# Patient Record
Sex: FEMALE | Race: WHITE | NOT HISPANIC OR LATINO | Employment: FULL TIME | URBAN - METROPOLITAN AREA
[De-identification: names, ages, dates, MRNs, and addresses within clinical notes are randomized per-mention and may not be internally consistent; named-entity substitution may affect disease eponyms.]

---

## 2017-03-09 ENCOUNTER — OFFICE VISIT (OUTPATIENT)
Dept: INTERNAL MEDICINE | Facility: CLINIC | Age: 67
End: 2017-03-09
Payer: COMMERCIAL

## 2017-03-09 ENCOUNTER — LAB VISIT (OUTPATIENT)
Dept: LAB | Facility: HOSPITAL | Age: 67
End: 2017-03-09
Attending: INTERNAL MEDICINE
Payer: COMMERCIAL

## 2017-03-09 VITALS
HEIGHT: 66 IN | SYSTOLIC BLOOD PRESSURE: 110 MMHG | BODY MASS INDEX: 24.59 KG/M2 | DIASTOLIC BLOOD PRESSURE: 76 MMHG | HEART RATE: 52 BPM | WEIGHT: 153 LBS

## 2017-03-09 DIAGNOSIS — R51.9 NONINTRACTABLE EPISODIC HEADACHE, UNSPECIFIED HEADACHE TYPE: ICD-10-CM

## 2017-03-09 DIAGNOSIS — G47.00 INSOMNIA, UNSPECIFIED TYPE: ICD-10-CM

## 2017-03-09 DIAGNOSIS — R25.2 FOOT CRAMPS: ICD-10-CM

## 2017-03-09 DIAGNOSIS — F32.A DEPRESSION, UNSPECIFIED DEPRESSION TYPE: ICD-10-CM

## 2017-03-09 DIAGNOSIS — Z23 NEED FOR TDAP VACCINATION: ICD-10-CM

## 2017-03-09 DIAGNOSIS — Z00.00 ANNUAL PHYSICAL EXAM: ICD-10-CM

## 2017-03-09 DIAGNOSIS — Z00.00 ANNUAL PHYSICAL EXAM: Primary | ICD-10-CM

## 2017-03-09 DIAGNOSIS — E89.0 POSTOPERATIVE HYPOTHYROIDISM: ICD-10-CM

## 2017-03-09 LAB
25(OH)D3+25(OH)D2 SERPL-MCNC: 44 NG/ML
ALBUMIN SERPL BCP-MCNC: 3.7 G/DL
ALP SERPL-CCNC: 67 U/L
ALT SERPL W/O P-5'-P-CCNC: 12 U/L
ANION GAP SERPL CALC-SCNC: 9 MMOL/L
AST SERPL-CCNC: 16 U/L
BILIRUB SERPL-MCNC: 0.3 MG/DL
BUN SERPL-MCNC: 14 MG/DL
CALCIUM SERPL-MCNC: 9.1 MG/DL
CHLORIDE SERPL-SCNC: 103 MMOL/L
CO2 SERPL-SCNC: 28 MMOL/L
CREAT SERPL-MCNC: 0.9 MG/DL
EST. GFR  (AFRICAN AMERICAN): >60 ML/MIN/1.73 M^2
EST. GFR  (NON AFRICAN AMERICAN): >60 ML/MIN/1.73 M^2
GLUCOSE SERPL-MCNC: 86 MG/DL
MAGNESIUM SERPL-MCNC: 1.8 MG/DL
POTASSIUM SERPL-SCNC: 4 MMOL/L
PROT SERPL-MCNC: 6.9 G/DL
SODIUM SERPL-SCNC: 140 MMOL/L
TSH SERPL DL<=0.005 MIU/L-ACNC: 1.45 UIU/ML
VIT B12 SERPL-MCNC: 422 PG/ML

## 2017-03-09 PROCEDURE — 36415 COLL VENOUS BLD VENIPUNCTURE: CPT

## 2017-03-09 PROCEDURE — 82306 VITAMIN D 25 HYDROXY: CPT

## 2017-03-09 PROCEDURE — 99396 PREV VISIT EST AGE 40-64: CPT | Mod: 25,S$GLB,, | Performed by: INTERNAL MEDICINE

## 2017-03-09 PROCEDURE — 90471 IMMUNIZATION ADMIN: CPT | Mod: S$GLB,,, | Performed by: INTERNAL MEDICINE

## 2017-03-09 PROCEDURE — 99999 PR PBB SHADOW E&M-EST. PATIENT-LVL III: CPT | Mod: PBBFAC,,, | Performed by: INTERNAL MEDICINE

## 2017-03-09 PROCEDURE — 80053 COMPREHEN METABOLIC PANEL: CPT

## 2017-03-09 PROCEDURE — 90715 TDAP VACCINE 7 YRS/> IM: CPT | Mod: S$GLB,,, | Performed by: INTERNAL MEDICINE

## 2017-03-09 PROCEDURE — 84443 ASSAY THYROID STIM HORMONE: CPT

## 2017-03-09 PROCEDURE — 82607 VITAMIN B-12: CPT

## 2017-03-09 PROCEDURE — 83735 ASSAY OF MAGNESIUM: CPT

## 2017-03-09 RX ORDER — LEVOTHYROXINE SODIUM 175 UG/1
350 TABLET ORAL DAILY
Qty: 180 TABLET | Refills: 3 | Status: SHIPPED | OUTPATIENT
Start: 2017-03-09 | End: 2018-03-23 | Stop reason: SDUPTHER

## 2017-03-09 RX ORDER — ESCITALOPRAM OXALATE 20 MG/1
20 TABLET ORAL DAILY
COMMUNITY
End: 2017-12-12

## 2017-03-09 RX ORDER — FEXOFENADINE HCL AND PSEUDOEPHEDRINE HCI 60; 120 MG/1; MG/1
1 TABLET, EXTENDED RELEASE ORAL DAILY PRN
COMMUNITY
End: 2021-11-09

## 2017-03-09 RX ORDER — BUPROPION HYDROCHLORIDE 150 MG/1
150 TABLET, EXTENDED RELEASE ORAL 2 TIMES DAILY
Qty: 90 TABLET | Refills: 3 | Status: SHIPPED | OUTPATIENT
Start: 2017-03-09 | End: 2017-12-15 | Stop reason: SDUPTHER

## 2017-03-09 RX ORDER — ESZOPICLONE 1 MG/1
1 TABLET, FILM COATED ORAL NIGHTLY
COMMUNITY
End: 2018-04-06 | Stop reason: DRUGHIGH

## 2017-03-09 RX ORDER — CLOBETASOL PROPIONATE 0.5 MG/G
OINTMENT TOPICAL
Refills: 5 | COMMUNITY
Start: 2017-01-01 | End: 2017-03-09

## 2017-03-09 RX ORDER — BUTALBITAL, ACETAMINOPHEN AND CAFFEINE 50; 325; 40 MG/1; MG/1; MG/1
1 TABLET ORAL EVERY 6 HOURS PRN
Qty: 30 TABLET | Refills: 1 | Status: SHIPPED | OUTPATIENT
Start: 2017-03-09 | End: 2018-04-06 | Stop reason: SDUPTHER

## 2017-03-09 NOTE — MR AVS SNAPSHOT
[facility]  [address]  [place] [address]  Phone: [phone]  Fax: [phone]                  Ana Lilia Glass   3/9/2017 1:00 PM   Office Visit    Description:  Female : 1950   Provider:  Santy Momin MD   Department:  [facility]           Reason for Visit     Annual Exam           Diagnoses this Visit        Comments    Annual physical exam    -  Primary     Foot cramps         Postoperative hypothyroidism         Need for Tdap vaccination         Depression, unspecified depression type         Nonintractable episodic headache, unspecified headache type         Insomnia, unspecified type                To Do List           Goals (5 Years of Data)     None      Follow-Up and Disposition     Return in about 1 year (around 3/9/2018) for EPP annual exam.       These Medications        Disp Refills Start End    buPROPion (WELLBUTRIN SR) 150 MG TBSR 12 hr tablet 90 tablet 3 3/9/2017     Take 1 tablet (150 mg total) by mouth 2 (two) times daily. - Oral    Pharmacy: [facility] - [place], [place] - [address] [facility] #: [phone]       levothyroxine (SYNTHROID, LEVOTHROID) 175 MCG tablet 180 tablet 3 3/9/2017     Take 2 tablets (350 mcg total) by mouth once daily. In the morning on an empty stomach - Oral    Pharmacy: [facility] - [place], [place] - [address]  Ph #: [phone]       butalbital-acetaminophen-caffeine -40 mg (FIORICET, ESGIC) -40 mg per tablet 30 tablet 1 3/9/2017     Take 1 tablet by mouth every 6 (six) hours as needed for Pain. - Oral    Pharmacy: [facility] - [place], [place] - [address] [place] [address] Ph #: [phone]         [facility] On Call     [facility] On Call Nurse Care Line -  Assistance  Registered nurses in the [facility] provide clinical advisement, health education, appointment booking, and other advisory services.  Call for this free service at [phone].             Medications  "          Message regarding Medications     Verify the changes and/or additions to your medication regime listed below are the same as discussed with your clinician today.  If any of these changes or additions are incorrect, please notify your healthcare provider.        STOP taking these medications     clobetasol 0.05% (TEMOVATE) 0.05 % Oint VIRGILIO AA BID           Verify that the below list of medications is an accurate representation of the medications you are currently taking.  If none reported, the list may be blank. If incorrect, please contact your healthcare provider. Carry this list with you in case of emergency.           Current Medications     BIOTIN ORAL Take 1 tablet by mouth once daily.    buPROPion (WELLBUTRIN SR) 150 MG TBSR 12 hr tablet Take 1 tablet (150 mg total) by mouth 2 (two) times daily.    butalbital-acetaminophen-caffeine -40 mg (FIORICET, ESGIC) -40 mg per tablet Take 1 tablet by mouth every 6 (six) hours as needed for Pain.    CALCIUM CARBONATE/VITAMIN D3 (VITAMIN D-3 ORAL) Take 1 capsule by mouth once daily.    escitalopram oxalate (LEXAPRO) 20 MG tablet Take 20 mg by mouth once daily.    estrogens, conjugated, (PREMARIN) 0.45 MG tablet 1 tablet.    eszopiclone (LUNESTA) 1 MG Tab Take 1 mg by mouth nightly.    fexofenadine-pseudoephedrine  mg (ALLEGRA-D)  mg per tablet Take 1 tablet by mouth daily as needed.    fluticasone (FLONASE) 50 mcg/actuation nasal spray 1 spray by Each Nare route once daily.    levothyroxine (SYNTHROID, LEVOTHROID) 175 MCG tablet Take 2 tablets (350 mcg total) by mouth once daily. In the morning on an empty stomach    LINZESS 145 mcg Cap capsule 1 tablet.    dexlansoprazole (DEXILANT) 60 mg capsule Take 1 capsule (60 mg total) by mouth once daily.           Clinical Reference Information           Your Vitals Were     BP Pulse Height Weight BMI    110/76 (BP Location: Right arm, Patient Position: Sitting, BP Method: Manual) 52 5' 6" (1.676 " m) 69.4 kg (153 lb) 24.69 kg/m2      Blood Pressure          Most Recent Value    BP  110/76      Allergies as of 3/9/2017     Bactrim [Sulfamethoxazole-trimethoprim]    Codeine    Sulfa (Sulfonamide Antibiotics)      Immunizations Administered on Date of Encounter - 3/9/2017     Name Date Dose VIS Date Route    TDAP 3/9/2017 0.5 mL 2/24/2015 Intramuscular      Orders Placed During Today's Visit      Normal Orders This Visit    Tdap Vaccine     Future Labs/Procedures Expected by Expires    Comprehensive metabolic panel  3/9/2017 3/9/2018    Magnesium  3/9/2017 (Approximate) 5/8/2018    TSH  3/9/2017 (Approximate) 3/9/2018    Vitamin B12  3/9/2017 5/8/2018    Vitamin D  3/9/2017 (Approximate) 5/8/2018      Language Assistance Services     ATTENTION: Language assistance services are available, free of charge. Please call [phone].      ATENCIÓN: Si habla español, tiene a gonzalez disposición servicios gratuitos de asistencia lingüística. Llame al [phone].     CHÚ Ý: N?u b?n nói Ti?ng Vi?t, có các d?ch v? h? tr? ngôn ng? mi?n phí gilmarh cho b?n. G?i s? [phone].         [facility] complies with applicable Federal civil rights laws and does not discriminate on the basis of race, color, national origin, age, disability, or sex.

## 2017-03-09 NOTE — PROGRESS NOTES
Subjective:       Patient ID: Ana Lilia Glass is a 66 y.o. female.    Chief Complaint: Annual Exam    HPI    First visit with me. Seen by Dr London last year.     Noted mm cramps in feet. Only at night. Sporadic. Only when stretching.     Doing better since starting Lexapro and Lunesta PRN.    Reviewed PMH, PSH, SH, FH, allergies, and medications.     Review of Systems   All other systems reviewed and are negative.      Objective:      Physical Exam   Constitutional: She is oriented to person, place, and time. No distress.    woman whose Body mass index is 24.69 kg/(m^2).    HENT:   Head: Atraumatic.   Right Ear: Tympanic membrane normal.   Left Ear: Tympanic membrane normal.   Mouth/Throat: Oropharynx is clear and moist. No oropharyngeal exudate.   Eyes: Pupils are equal, round, and reactive to light. Right eye exhibits no discharge. Left eye exhibits no discharge.   Neck: Normal range of motion. No thyromegaly present.   Cardiovascular: Normal rate, regular rhythm and normal heart sounds.    Pulses:       Dorsalis pedis pulses are 2+ on the left side.        Posterior tibial pulses are 2+ on the left side.   Pulmonary/Chest: Effort normal and breath sounds normal. No stridor. She has no wheezes. She has no rales.   Abdominal: Soft. She exhibits no distension and no mass. There is no hepatosplenomegaly. There is no tenderness. There is no guarding and negative Lang's sign.   Musculoskeletal: She exhibits no edema or tenderness.   L foot without deformity or tenderness to palpation    Lymphadenopathy:     She has no cervical adenopathy.   Neurological: She is alert and oriented to person, place, and time.   Skin: Skin is warm and dry. No rash noted.   Mild superficial varicose veins in L ankle   Psychiatric: She has a normal mood and affect. Her behavior is normal.   Nursing note and vitals reviewed.      Vitals:    03/09/17 1308   BP: 110/76   BP Location: Right arm   Patient Position: Sitting   BP  "Method: Manual   Pulse: (!) 52   Weight: 69.4 kg (153 lb)   Height: 5' 6" (1.676 m)     Body mass index is 24.69 kg/(m^2).    RESULTS: Reviewed labs from last 12 months    The 10-year ASCVD risk score (Jackson HOWELL Jr, et al., 2013) is: 3.2%    Values used to calculate the score:      Age: 66 years      Sex: Female      Is Non- : No      Diabetic: No      Tobacco smoker: No      Systolic Blood Pressure: 110 mmHg      Is BP treated: No      HDL Cholesterol: 70 mg/dL      Total Cholesterol: 188 mg/dL     Assessment:       1. Annual physical exam    2. Foot cramps    3. Postoperative hypothyroidism    4. Need for Tdap vaccination    5. Depression, unspecified depression type    6. Nonintractable episodic headache, unspecified headache type    7. Insomnia, unspecified type        Plan:   Ana Lilia was seen today for annual exam.    Diagnoses and all orders for this visit:    Annual physical exam:  Age-appropriate health screening reviewed, indicated tests ordered.   -     Comprehensive metabolic panel; Future  -     Magnesium; Future  -     TSH; Future  -     Vitamin D; Future  -     Vitamin B12; Future    Foot cramps:  Likely due to lack of stretching, encouraged gentle exercise.    Postoperative hypothyroidism:  Prior diagnosis, well controlled on current management. No changes at this time, will continue to monitor.   -     levothyroxine (SYNTHROID, LEVOTHROID) 175 MCG tablet; Take 2 tablets (350 mcg total) by mouth once daily. In the morning on an empty stomach    Need for Tdap vaccination  -     Tdap Vaccine    Depression, unspecified depression type:  Prior diagnosis, well controlled on current management. No changes at this time, will continue to monitor. Gets Lexapro from outside Psychiatry.  -     buPROPion (WELLBUTRIN SR) 150 MG TBSR 12 hr tablet; Take 1 tablet (150 mg total) by mouth 2 (two) times daily.    Nonintractable episodic headache, unspecified headache type:  Rare, uses Fioricet " rarely, continue PRN.  -     butalbital-acetaminophen-caffeine -40 mg (FIORICET, ESGIC) -40 mg per tablet; Take 1 tablet by mouth every 6 (six) hours as needed for Pain.    Insomnia, unspecified type:  Seen by outside Psychiatry, well controlled on Lunesta.    Return in about 1 year (around 3/9/2018) for EPP annual exam. Labs today.  Santy Momin MD  Internal Medicine    Portions of this note were completed using Dragon medical dictation software. Please excuse typographical or syntax errors.

## 2017-03-13 ENCOUNTER — PATIENT MESSAGE (OUTPATIENT)
Dept: INTERNAL MEDICINE | Facility: CLINIC | Age: 67
End: 2017-03-13

## 2017-10-10 ENCOUNTER — DOCUMENTATION ONLY (OUTPATIENT)
Dept: INTERNAL MEDICINE | Facility: CLINIC | Age: 67
End: 2017-10-10

## 2017-10-10 NOTE — PROGRESS NOTES
Received outside records from Orthopedics, chart updated as appropriate, results will be scanned into EPIC.    Briefly, seen for trochanteric bursitis, received bursa injection. Given prescription for Vimovo and Physical Therapy, reevaluate in 6 weeks with Orthopedics.    Santy Momin MD  Internal Medicine    Portions of this note were completed using [employer] dictation software. Please excuse typographical or syntax errors.

## 2017-12-12 ENCOUNTER — OFFICE VISIT (OUTPATIENT)
Dept: INTERNAL MEDICINE | Facility: CLINIC | Age: 67
End: 2017-12-12
Payer: COMMERCIAL

## 2017-12-12 ENCOUNTER — TELEPHONE (OUTPATIENT)
Dept: INTERNAL MEDICINE | Facility: CLINIC | Age: 67
End: 2017-12-12

## 2017-12-12 ENCOUNTER — LAB VISIT (OUTPATIENT)
Dept: LAB | Facility: HOSPITAL | Age: 67
End: 2017-12-12
Attending: INTERNAL MEDICINE
Payer: COMMERCIAL

## 2017-12-12 VITALS
BODY MASS INDEX: 25.23 KG/M2 | HEART RATE: 65 BPM | WEIGHT: 157 LBS | SYSTOLIC BLOOD PRESSURE: 120 MMHG | DIASTOLIC BLOOD PRESSURE: 75 MMHG | HEIGHT: 66 IN | OXYGEN SATURATION: 95 %

## 2017-12-12 DIAGNOSIS — R11.0 NAUSEA: ICD-10-CM

## 2017-12-12 DIAGNOSIS — R11.0 NAUSEA: Primary | ICD-10-CM

## 2017-12-12 DIAGNOSIS — E03.9 HYPOTHYROIDISM, UNSPECIFIED TYPE: ICD-10-CM

## 2017-12-12 DIAGNOSIS — R42 DIZZINESS: ICD-10-CM

## 2017-12-12 LAB
ALBUMIN SERPL BCP-MCNC: 3.9 G/DL
ALP SERPL-CCNC: 59 U/L
ALT SERPL W/O P-5'-P-CCNC: 8 U/L
ANION GAP SERPL CALC-SCNC: 8 MMOL/L
AST SERPL-CCNC: 14 U/L
BASOPHILS # BLD AUTO: 0.04 K/UL
BASOPHILS NFR BLD: 0.6 %
BILIRUB SERPL-MCNC: 0.2 MG/DL
BUN SERPL-MCNC: 17 MG/DL
CALCIUM SERPL-MCNC: 9.3 MG/DL
CHLORIDE SERPL-SCNC: 105 MMOL/L
CO2 SERPL-SCNC: 27 MMOL/L
CREAT SERPL-MCNC: 0.8 MG/DL
DIFFERENTIAL METHOD: ABNORMAL
EOSINOPHIL # BLD AUTO: 0.1 K/UL
EOSINOPHIL NFR BLD: 0.8 %
ERYTHROCYTE [DISTWIDTH] IN BLOOD BY AUTOMATED COUNT: 13.7 %
EST. GFR  (AFRICAN AMERICAN): >60 ML/MIN/1.73 M^2
EST. GFR  (NON AFRICAN AMERICAN): >60 ML/MIN/1.73 M^2
GLUCOSE SERPL-MCNC: 92 MG/DL
HCT VFR BLD AUTO: 42 %
HGB BLD-MCNC: 13.8 G/DL
LYMPHOCYTES # BLD AUTO: 2 K/UL
LYMPHOCYTES NFR BLD: 31.5 %
MCH RBC QN AUTO: 32.1 PG
MCHC RBC AUTO-ENTMCNC: 32.9 G/DL
MCV RBC AUTO: 98 FL
MONOCYTES # BLD AUTO: 0.6 K/UL
MONOCYTES NFR BLD: 8.7 %
NEUTROPHILS # BLD AUTO: 3.7 K/UL
NEUTROPHILS NFR BLD: 58.2 %
PLATELET # BLD AUTO: 286 K/UL
PMV BLD AUTO: 10.2 FL
POTASSIUM SERPL-SCNC: 4 MMOL/L
PROT SERPL-MCNC: 6.9 G/DL
RBC # BLD AUTO: 4.3 M/UL
SODIUM SERPL-SCNC: 140 MMOL/L
TSH SERPL DL<=0.005 MIU/L-ACNC: 3.6 UIU/ML
WBC # BLD AUTO: 6.32 K/UL

## 2017-12-12 PROCEDURE — 99999 PR PBB SHADOW E&M-EST. PATIENT-LVL III: CPT | Mod: PBBFAC,,, | Performed by: INTERNAL MEDICINE

## 2017-12-12 PROCEDURE — 99214 OFFICE O/P EST MOD 30 MIN: CPT | Mod: S$GLB,,, | Performed by: INTERNAL MEDICINE

## 2017-12-12 PROCEDURE — 80053 COMPREHEN METABOLIC PANEL: CPT

## 2017-12-12 PROCEDURE — 85025 COMPLETE CBC W/AUTO DIFF WBC: CPT

## 2017-12-12 PROCEDURE — 36415 COLL VENOUS BLD VENIPUNCTURE: CPT

## 2017-12-12 PROCEDURE — 84443 ASSAY THYROID STIM HORMONE: CPT

## 2017-12-12 RX ORDER — ONDANSETRON 4 MG/1
4 TABLET, ORALLY DISINTEGRATING ORAL ONCE
Qty: 30 TABLET | Refills: 0 | Status: SHIPPED | OUTPATIENT
Start: 2017-12-12 | End: 2017-12-12 | Stop reason: SDUPTHER

## 2017-12-12 RX ORDER — ONDANSETRON 4 MG/1
4 TABLET, ORALLY DISINTEGRATING ORAL ONCE
Qty: 1 TABLET | Refills: 0 | Status: SHIPPED | OUTPATIENT
Start: 2017-12-12 | End: 2017-12-12 | Stop reason: SDUPTHER

## 2017-12-12 RX ORDER — ONDANSETRON 4 MG/1
4 TABLET, ORALLY DISINTEGRATING ORAL EVERY 8 HOURS PRN
Qty: 30 TABLET | Refills: 0 | Status: SHIPPED | OUTPATIENT
Start: 2017-12-12 | End: 2018-04-06

## 2017-12-12 NOTE — TELEPHONE ENCOUNTER
Pt was at [facility] but only 1 zofran was sent it, went ahead and sent 30 pills, if not better or worsening in 3 days she will let Dr. Lucero or her PCP know.

## 2017-12-12 NOTE — PROGRESS NOTES
"Subjective:       Patient ID: Ana Lilia Glass is a 67 y.o. female.    Chief Complaint: Nausea and Dizziness    HPI urgent, Dr. Momin    Reports that she woke up yesterday w/ dizziness (not lightheadedness/room spinning/nothing is moving around). Dizziness even when sitting. Irrespective of position. If she lies down to go to sleep, she feels ok.   No vomiting/constipation/diarrhea. Has daily BM w/ Linzess. No abdominal pain. Nauseated. Able to keep down food and no vomiting.   Reports glasses are up to date - has upcoming appt w/ eye doctor in January. No blurry vision  No numbness/tingling/weakness/slurred speech.   No fullness in the ear/ear pain/tinnitus. Feels congested but able to breathe normal. C/o bitter metal taste in the mouth for the last few days. No postnasal drip. Blows her nose in the morning but that's chronic.   No fevers. Chills. No dysuria/urinary frequency.  Stays hydrated.      No chest pain/SOB/DUFFY/palpitations/leg swelling.    Pt reports was weaned off of Lexapro a few weeks ago (went from 10mg to 5mg daily for a few weeks, then every other day and then every 2 days).    Review of Systems  as above in HPI.     Objective:      Physical Exam    /75 (BP Location: Left arm, Patient Position: Lying)   Pulse 65   Ht 5' 6" (1.676 m)   Wt 71.2 kg (157 lb)   SpO2 95%   BMI 25.34 kg/m²   Not orthostatic by vitals or symptoms.     GEN - A+OX4, NAD   HEENT - PERRL, EOMI, OP mildly erythematous. MMM. TM normal.   Neck - No thyromegaly or cervical LAD. No thyroid masses felt.  CV - RRR, no m/r   Chest - CTAB, no wheezing or rhonchi  Abd - S/NT/ND/+BS.   Ext - 2+BDP and radial pulses. No LE edema.   Neuro - 5/5 BUE and BLE strength. PERRL, EOMI, no nystagmus, eyebrow raise, facial sensation, hearing, m of mastication, smile, palatal raise, shoulder shrug, tongue protrusion symmetric and intact. Sensation to light touch intact throughout. 2+ DTRs. No dysdiadokinesia.  Skin - No rash.    Previous " labs reviewed.    Assessment/Plan     Ana Lilia was seen today for nausea and dizziness.    Diagnoses and all orders for this visit:    Nausea  -     Comprehensive metabolic panel; Future  -     CBC auto differential; Future  -     ondansetron (ZOFRAN-ODT) 4 MG TbDL; Take 1 tablet (4 mg total) by mouth once.    Hypothyroidism, unspecified type - cont synthroid.  -     TSH; Future    Dizziness - normal neurological exam. Not orthostatic. Will check labs.       Return if symptoms worsen or fail to improve.      Marianna Lucero MD  Department of Internal Medicine - [facility]  1:30 PM

## 2017-12-13 ENCOUNTER — TELEPHONE (OUTPATIENT)
Dept: INTERNAL MEDICINE | Facility: CLINIC | Age: 67
End: 2017-12-13

## 2017-12-13 NOTE — TELEPHONE ENCOUNTER
----- Message from Harjeet Cole sent at 12/12/2017  2:23 PM CST -----  Contact: Ludwin nolan/santa [phone]  She need to verify the dosage and the quantity of the prescription you just sent.    Thank you

## 2017-12-15 DIAGNOSIS — F32.A DEPRESSION, UNSPECIFIED DEPRESSION TYPE: ICD-10-CM

## 2017-12-18 RX ORDER — BUPROPION HYDROCHLORIDE 150 MG/1
TABLET, EXTENDED RELEASE ORAL
Qty: 180 TABLET | Refills: 3 | Status: SHIPPED | OUTPATIENT
Start: 2017-12-18 | End: 2018-04-06 | Stop reason: SDUPTHER

## 2018-02-22 ENCOUNTER — PATIENT MESSAGE (OUTPATIENT)
Dept: INTERNAL MEDICINE | Facility: CLINIC | Age: 68
End: 2018-02-22

## 2018-03-23 DIAGNOSIS — E89.0 POSTOPERATIVE HYPOTHYROIDISM: ICD-10-CM

## 2018-03-23 RX ORDER — LEVOTHYROXINE SODIUM 175 UG/1
350 TABLET ORAL DAILY
Qty: 180 TABLET | Refills: 3 | Status: SHIPPED | OUTPATIENT
Start: 2018-03-23 | End: 2019-01-02 | Stop reason: SDUPTHER

## 2018-04-06 ENCOUNTER — OFFICE VISIT (OUTPATIENT)
Dept: INTERNAL MEDICINE | Facility: CLINIC | Age: 68
End: 2018-04-06
Payer: COMMERCIAL

## 2018-04-06 VITALS
WEIGHT: 158.75 LBS | HEART RATE: 60 BPM | SYSTOLIC BLOOD PRESSURE: 116 MMHG | HEIGHT: 67 IN | BODY MASS INDEX: 24.92 KG/M2 | DIASTOLIC BLOOD PRESSURE: 68 MMHG

## 2018-04-06 DIAGNOSIS — Z23 NEED FOR VACCINATION WITH 13-POLYVALENT PNEUMOCOCCAL CONJUGATE VACCINE: ICD-10-CM

## 2018-04-06 DIAGNOSIS — F32.A DEPRESSION, UNSPECIFIED DEPRESSION TYPE: ICD-10-CM

## 2018-04-06 DIAGNOSIS — G47.62 NOCTURNAL LEG CRAMPS: ICD-10-CM

## 2018-04-06 DIAGNOSIS — E89.0 POSTOPERATIVE HYPOTHYROIDISM: ICD-10-CM

## 2018-04-06 DIAGNOSIS — R51.9 NONINTRACTABLE EPISODIC HEADACHE, UNSPECIFIED HEADACHE TYPE: ICD-10-CM

## 2018-04-06 DIAGNOSIS — K21.9 GASTROESOPHAGEAL REFLUX DISEASE, ESOPHAGITIS PRESENCE NOT SPECIFIED: ICD-10-CM

## 2018-04-06 DIAGNOSIS — J30.1 SEASONAL ALLERGIC RHINITIS DUE TO POLLEN, UNSPECIFIED CHRONICITY: ICD-10-CM

## 2018-04-06 DIAGNOSIS — Z00.00 ANNUAL PHYSICAL EXAM: Primary | ICD-10-CM

## 2018-04-06 PROCEDURE — 99397 PER PM REEVAL EST PAT 65+ YR: CPT | Mod: S$GLB,,, | Performed by: INTERNAL MEDICINE

## 2018-04-06 PROCEDURE — 99999 PR PBB SHADOW E&M-EST. PATIENT-LVL III: CPT | Mod: PBBFAC,,, | Performed by: INTERNAL MEDICINE

## 2018-04-06 RX ORDER — ESCITALOPRAM OXALATE 10 MG/1
10 TABLET ORAL DAILY
COMMUNITY
Start: 2018-02-07 | End: 2020-06-12

## 2018-04-06 RX ORDER — ESZOPICLONE 2 MG/1
2 TABLET, FILM COATED ORAL NIGHTLY PRN
COMMUNITY
Start: 2018-03-13 | End: 2020-06-12

## 2018-04-06 RX ORDER — FLUTICASONE PROPIONATE 50 MCG
1 SPRAY, SUSPENSION (ML) NASAL DAILY
Qty: 16 G | Refills: 2 | Status: SHIPPED | OUTPATIENT
Start: 2018-04-06 | End: 2022-05-10 | Stop reason: SDUPTHER

## 2018-04-06 RX ORDER — BUPROPION HYDROCHLORIDE 150 MG/1
150 TABLET, EXTENDED RELEASE ORAL 2 TIMES DAILY
Qty: 180 TABLET | Refills: 3 | Status: SHIPPED | OUTPATIENT
Start: 2018-04-06 | End: 2019-05-10 | Stop reason: SDUPTHER

## 2018-04-06 RX ORDER — BUTALBITAL, ACETAMINOPHEN AND CAFFEINE 50; 325; 40 MG/1; MG/1; MG/1
1 TABLET ORAL EVERY 6 HOURS PRN
Qty: 30 TABLET | Refills: 2 | Status: SHIPPED | OUTPATIENT
Start: 2018-04-06 | End: 2019-05-10 | Stop reason: SDUPTHER

## 2018-04-06 NOTE — PROGRESS NOTES
"Subjective:       Patient ID: Ana Lilia Glass is a 67 y.o. female.    Chief Complaint: Annual Exam    HPI    Last visit with me 03/2017. Seen since by Internal Medicine.  Received documentation from outside orthopedics noting that the patient had a trochanteric bursa injection.    Patient is doing well and in usual state of health.  Continues to do walking for exercise, but no other regular exercise.  Occasionally gets charley horses at night.    Reviewed PMH, PSH, SH, FH, allergies, and medications.     Review of Systems   All other systems reviewed and are negative.      Objective:      Physical Exam   Constitutional: She is oriented to person, place, and time. No distress.   HENT:   Head: Atraumatic.   Right Ear: Tympanic membrane normal. No tenderness.   Left Ear: Tympanic membrane normal. No tenderness.   Mouth/Throat: Oropharynx is clear and moist. No oropharyngeal exudate.   Eyes: Pupils are equal, round, and reactive to light. Right eye exhibits no discharge. Left eye exhibits no discharge.   Neck: Normal range of motion. No thyromegaly present.   Cardiovascular: Normal rate, regular rhythm and normal heart sounds.    Pulmonary/Chest: Effort normal and breath sounds normal. No stridor. She has no wheezes. She has no rales.   Abdominal: Soft. She exhibits no distension and no mass. There is no tenderness. There is no guarding.   Musculoskeletal: She exhibits no edema or tenderness.   Lymphadenopathy:     She has no cervical adenopathy.   Neurological: She is alert and oriented to person, place, and time.   Skin: Skin is warm and dry. No rash noted.   Mild varicose veins in bilateral lower extremities    Psychiatric: She has a normal mood and affect. Her behavior is normal.   Nursing note and vitals reviewed.      Vitals:    04/06/18 1601   BP: 116/68   BP Location: Right arm   Patient Position: Sitting   BP Method: Large (Manual)   Pulse: 60   Weight: 72 kg (158 lb 11.7 oz)   Height: 5' 6.5" (1.689 m) "     Body mass index is 25.24 kg/m².    RESULTS: Reviewed labs from last 12 months    The 10-year ASCVD risk score (Jacksongwendolyn HOWELL Jr., et al., 2013) is: 4%    Values used to calculate the score:      Age: 67 years      Sex: Female      Is Non- : No      Diabetic: No      Tobacco smoker: No      Systolic Blood Pressure: 116 mmHg      Is BP treated: No      HDL Cholesterol: 70 mg/dL      Total Cholesterol: 188 mg/dL     Assessment:       1. Annual physical exam    2. Postoperative hypothyroidism    3. Gastroesophageal reflux disease, esophagitis presence not specified    4. Nonintractable episodic headache, unspecified headache type    5. Depression, unspecified depression type    6. Seasonal allergic rhinitis due to pollen, unspecified chronicity    7. Nocturnal leg cramps    8. Need for vaccination with 13-polyvalent pneumococcal conjugate vaccine        Plan:   Ana Lilia was seen today for annual exam.    Diagnoses and all orders for this visit:    Annual physical exam:  Age-appropriate health screening reviewed, indicated tests ordered. Prevnar today.    Postoperative hypothyroidism:  Prior diagnosis, well controlled on current management. No changes at this time, will continue to monitor.   -     TSH; Future    Gastroesophageal reflux disease, esophagitis presence not specified:  Prior diagnosis, well controlled on current management. No changes at this time, will continue to monitor.   -     Comprehensive metabolic panel; Future  -     CBC Without Differential; Future    Nonintractable episodic headache, unspecified headache type:  Prior diagnosis, well controlled on current management. No changes at this time, will continue to monitor.   -     butalbital-acetaminophen-caffeine -40 mg (FIORICET, ESGIC) -40 mg per tablet; Take 1 tablet by mouth every 6 (six) hours as needed for Pain.    Depression, unspecified depression type:  Prior diagnosis, well controlled on current management.  No changes at this time, will continue to monitor.   -     buPROPion (WELLBUTRIN SR) 150 MG TBSR 12 hr tablet; Take 1 tablet (150 mg total) by mouth 2 (two) times daily.    Seasonal allergic rhinitis due to pollen, unspecified chronicity:  Prior diagnosis, well controlled on current management. No changes at this time, will continue to monitor.   -     fluticasone (FLONASE) 50 mcg/actuation nasal spray; 1 spray (50 mcg total) by Each Nare route once daily.    Nocturnal leg cramps:  Chronic problem, not overly problematic. Encourage gentle stretching daily.    Follow-up in about 1 year (around 4/6/2019) for EPP annual exam, fasting labs 1 week prior.  Santy Momin MD  Internal Medicine    Portions of this note were completed using [employer] dictation software. Please excuse typographical or syntax errors.

## 2018-04-16 ENCOUNTER — DOCUMENTATION ONLY (OUTPATIENT)
Dept: INTERNAL MEDICINE | Facility: CLINIC | Age: 68
End: 2018-04-16

## 2018-04-16 DIAGNOSIS — Z87.39 HISTORY OF OSTEOPOROSIS: ICD-10-CM

## 2018-04-16 NOTE — PROGRESS NOTES
Received outside records from [facility], chart updated as appropriate, results will be scanned into EPIC.    Briefly, DEXA scan performed March 5, 2016 demonstrated normal bone density with no osteopenia and no increased fracture risk.  Mammogram performed March 17, 2018 demonstrated no malignancy, recommended 1 year screening mammogram.    Santy Momin MD  Internal Medicine    Portions of this note were completed using [employer] dictation software. Please excuse typographical or syntax errors.

## 2019-01-02 DIAGNOSIS — E89.0 POSTOPERATIVE HYPOTHYROIDISM: ICD-10-CM

## 2019-01-02 RX ORDER — LEVOTHYROXINE SODIUM 175 UG/1
TABLET ORAL
Qty: 180 TABLET | Refills: 2 | Status: SHIPPED | OUTPATIENT
Start: 2019-01-02 | End: 2019-02-12 | Stop reason: SDUPTHER

## 2019-02-11 ENCOUNTER — PATIENT MESSAGE (OUTPATIENT)
Dept: INTERNAL MEDICINE | Facility: CLINIC | Age: 69
End: 2019-02-11

## 2019-02-11 DIAGNOSIS — E89.0 POSTOPERATIVE HYPOTHYROIDISM: ICD-10-CM

## 2019-02-12 RX ORDER — LEVOTHYROXINE SODIUM 175 UG/1
TABLET ORAL
Qty: 180 TABLET | Refills: 3 | Status: SHIPPED | OUTPATIENT
Start: 2019-02-12 | End: 2020-06-12 | Stop reason: DRUGHIGH

## 2019-02-12 NOTE — TELEPHONE ENCOUNTER
Med refilled. Please schedule the patient for fasting labs 1 week prior to her next visit with me (labs ordered 04/2018).

## 2019-02-21 ENCOUNTER — IMMUNIZATION (OUTPATIENT)
Dept: PHARMACY | Facility: CLINIC | Age: 69
End: 2019-02-21
Payer: MEDICARE

## 2019-04-02 ENCOUNTER — LAB VISIT (OUTPATIENT)
Dept: LAB | Facility: HOSPITAL | Age: 69
End: 2019-04-02
Attending: INTERNAL MEDICINE
Payer: MEDICARE

## 2019-04-02 DIAGNOSIS — E89.0 POSTOPERATIVE HYPOTHYROIDISM: ICD-10-CM

## 2019-04-02 DIAGNOSIS — K21.9 GASTROESOPHAGEAL REFLUX DISEASE, ESOPHAGITIS PRESENCE NOT SPECIFIED: ICD-10-CM

## 2019-04-02 LAB
ALBUMIN SERPL BCP-MCNC: 4.3 G/DL (ref 3.5–5.2)
ALP SERPL-CCNC: 79 U/L (ref 55–135)
ALT SERPL W/O P-5'-P-CCNC: 15 U/L (ref 10–44)
ANION GAP SERPL CALC-SCNC: 6 MMOL/L (ref 8–16)
AST SERPL-CCNC: 16 U/L (ref 10–40)
BILIRUB SERPL-MCNC: 0.4 MG/DL (ref 0.1–1)
BUN SERPL-MCNC: 14 MG/DL (ref 8–23)
CALCIUM SERPL-MCNC: 9.4 MG/DL (ref 8.7–10.5)
CHLORIDE SERPL-SCNC: 109 MMOL/L (ref 95–110)
CO2 SERPL-SCNC: 26 MMOL/L (ref 23–29)
CREAT SERPL-MCNC: 0.9 MG/DL (ref 0.5–1.4)
ERYTHROCYTE [DISTWIDTH] IN BLOOD BY AUTOMATED COUNT: 13.5 % (ref 11.5–14.5)
EST. GFR  (AFRICAN AMERICAN): >60 ML/MIN/1.73 M^2
EST. GFR  (NON AFRICAN AMERICAN): >60 ML/MIN/1.73 M^2
GLUCOSE SERPL-MCNC: 104 MG/DL (ref 70–110)
HCT VFR BLD AUTO: 46.9 % (ref 37–48.5)
HGB BLD-MCNC: 15.1 G/DL (ref 12–16)
MCH RBC QN AUTO: 31.5 PG (ref 27–31)
MCHC RBC AUTO-ENTMCNC: 32.2 G/DL (ref 32–36)
MCV RBC AUTO: 98 FL (ref 82–98)
PLATELET # BLD AUTO: 293 K/UL (ref 150–350)
PMV BLD AUTO: 10.1 FL (ref 9.2–12.9)
POTASSIUM SERPL-SCNC: 4.5 MMOL/L (ref 3.5–5.1)
PROT SERPL-MCNC: 7.3 G/DL (ref 6–8.4)
RBC # BLD AUTO: 4.8 M/UL (ref 4–5.4)
SODIUM SERPL-SCNC: 141 MMOL/L (ref 136–145)
TSH SERPL DL<=0.005 MIU/L-ACNC: 1.56 UIU/ML (ref 0.4–4)
WBC # BLD AUTO: 5.91 K/UL (ref 3.9–12.7)

## 2019-04-02 PROCEDURE — 84443 ASSAY THYROID STIM HORMONE: CPT | Mod: HCNC

## 2019-04-02 PROCEDURE — 80053 COMPREHEN METABOLIC PANEL: CPT | Mod: HCNC

## 2019-04-02 PROCEDURE — 36415 COLL VENOUS BLD VENIPUNCTURE: CPT | Mod: HCNC

## 2019-04-02 PROCEDURE — 85027 COMPLETE CBC AUTOMATED: CPT | Mod: HCNC

## 2019-04-09 ENCOUNTER — OFFICE VISIT (OUTPATIENT)
Dept: INTERNAL MEDICINE | Facility: CLINIC | Age: 69
End: 2019-04-09
Payer: MEDICARE

## 2019-04-09 ENCOUNTER — IMMUNIZATION (OUTPATIENT)
Dept: PHARMACY | Facility: CLINIC | Age: 69
End: 2019-04-09
Payer: MEDICARE

## 2019-04-09 VITALS
WEIGHT: 158 LBS | SYSTOLIC BLOOD PRESSURE: 120 MMHG | HEART RATE: 61 BPM | DIASTOLIC BLOOD PRESSURE: 76 MMHG | BODY MASS INDEX: 25.39 KG/M2 | HEIGHT: 66 IN

## 2019-04-09 DIAGNOSIS — Z13.6 SCREENING FOR CARDIOVASCULAR CONDITION: ICD-10-CM

## 2019-04-09 DIAGNOSIS — M54.50 ACUTE RIGHT-SIDED LOW BACK PAIN WITHOUT SCIATICA: ICD-10-CM

## 2019-04-09 DIAGNOSIS — Z00.00 ANNUAL PHYSICAL EXAM: Primary | ICD-10-CM

## 2019-04-09 DIAGNOSIS — Z23 NEED FOR 23-POLYVALENT PNEUMOCOCCAL POLYSACCHARIDE VACCINE: ICD-10-CM

## 2019-04-09 DIAGNOSIS — E89.0 POSTOPERATIVE HYPOTHYROIDISM: ICD-10-CM

## 2019-04-09 DIAGNOSIS — K21.9 GASTROESOPHAGEAL REFLUX DISEASE, ESOPHAGITIS PRESENCE NOT SPECIFIED: ICD-10-CM

## 2019-04-09 DIAGNOSIS — M25.552 LEFT HIP PAIN: ICD-10-CM

## 2019-04-09 PROCEDURE — 99999 PR PBB SHADOW E&M-EST. PATIENT-LVL III: ICD-10-PCS | Mod: PBBFAC,HCNC,, | Performed by: INTERNAL MEDICINE

## 2019-04-09 PROCEDURE — 99397 PER PM REEVAL EST PAT 65+ YR: CPT | Mod: HCNC,S$GLB,, | Performed by: INTERNAL MEDICINE

## 2019-04-09 PROCEDURE — 99397 PR PREVENTIVE VISIT,EST,65 & OVER: ICD-10-PCS | Mod: HCNC,S$GLB,, | Performed by: INTERNAL MEDICINE

## 2019-04-09 PROCEDURE — 99999 PR PBB SHADOW E&M-EST. PATIENT-LVL III: CPT | Mod: PBBFAC,HCNC,, | Performed by: INTERNAL MEDICINE

## 2019-04-09 RX ORDER — PANTOPRAZOLE SODIUM 40 MG/1
40 TABLET, DELAYED RELEASE ORAL
Qty: 90 TABLET | Refills: 3 | Status: SHIPPED | OUTPATIENT
Start: 2019-04-09 | End: 2020-06-12

## 2019-04-09 NOTE — PROGRESS NOTES
Subjective:       Patient ID: Ana Lilia Glass is a 68 y.o. female.    Chief Complaint: Annual Exam    HPI    Last visit with me 1 year ago. Since then had outside imaging. Briefly, DEXA scan performed March 5, 2016 demonstrated normal bone density with no osteopenia and no increased fracture risk.  Mammogram performed March 17, 2018 demonstrated no malignancy, recommended 1 year screening mammogram.    Pt reports pain in right lower back and left hip, mostly in the morning. Not severe, improves as day goes on. Has history of trochanteric bursitis of left hip.    Has cough, reports GERD symptoms. Had been drinking more wine, but stopped and cough has persisted. At night the symptoms are problematic. No heartburn. Taking omeprazole, doesn't work, doing for 3 weeks in AM. Had EGD in past, noted some mild hiatal hernia. No dyspnea on exertion. Sleeping okay. Has had bad allergies last few wks. More headache in AM, resolved with Fioricet. Cough preceded this.    Reviewed PMH, PSH, SH, FH, allergies, and medications.     Review of Systems   All other systems reviewed and are negative.      Objective:      Physical Exam   Constitutional: She is oriented to person, place, and time. No distress.   HENT:   Head: Atraumatic.   Right Ear: Tympanic membrane normal. No tenderness.   Left Ear: Tympanic membrane normal. No tenderness.   Mouth/Throat: Oropharynx is clear and moist. No oropharyngeal exudate.   Eyes: Pupils are equal, round, and reactive to light. Right eye exhibits no discharge. Left eye exhibits no discharge.   Neck: Normal range of motion. No thyromegaly present.   Cardiovascular: Normal rate, regular rhythm and normal heart sounds.   Pulmonary/Chest: Effort normal and breath sounds normal. No stridor. She has no wheezes. She has no rales.   Abdominal: Soft. Bowel sounds are normal. She exhibits no distension and no mass. There is no tenderness. There is no guarding.   Musculoskeletal: She exhibits no edema or  "tenderness.   Lymphadenopathy:     She has no cervical adenopathy.   Neurological: She is alert and oriented to person, place, and time.   Skin: Skin is warm and dry. Rash (mild faintly erythematous papular rash in anterior shins) noted.   Psychiatric: She has a normal mood and affect. Her behavior is normal.   Nursing note and vitals reviewed.      Vitals:    04/09/19 1328   BP: 120/76   BP Location: Right arm   Patient Position: Sitting   BP Method: Small (Manual)   Pulse: 61   Weight: 71.7 kg (158 lb)   Height: 5' 6" (1.676 m)     Body mass index is 25.5 kg/m².    RESULTS: Reviewed labs from last 12 months    Assessment:       1. Annual physical exam    2. Postoperative hypothyroidism    3. Screening for cardiovascular condition    4. Acute right-sided low back pain without sciatica    5. Left hip pain    6. Gastroesophageal reflux disease, esophagitis presence not specified    7. Need for 23-polyvalent pneumococcal polysaccharide vaccine        Plan:   Ana Lilia was seen today for annual exam.    Diagnoses and all orders for this visit:    Annual physical exam:  Age-appropriate health screening reviewed, indicated tests ordered.     Postoperative hypothyroidism:  Prior diagnosis, stable, well controlled on current management. No changes at this time, will continue to monitor.   -     TSH; Future    Screening for cardiovascular condition  -     CBC Without Differential; Future  -     Comprehensive metabolic panel; Future  -     Lipid panel; Future    Acute right-sided low back pain without sciatica:  New problem, not severe, possibly lumbar strain and/or piriformis syndrome. Refer to Physical Therapy for evaluation and treatment.  -     Ambulatory Referral to Physical/Occupational Therapy    Left hip pain:  New episode, likely trochanteric bursitis, see Physical Therapy.  -     Ambulatory Referral to Physical/Occupational Therapy    Gastroesophageal reflux disease, esophagitis presence not specified:  Prior " "diagnosis, recently more active.  Has been taking over-the-counter omeprazole in the morning for 3 weeks without resolution.  No heartburn but does have dry cough.  Symptoms are worse at night after dinner.  Start Protonix, instructions provided:  "Try starting the omeprazole 20-30 min before dinner until pantoprazole arrives. Use pantoprazole for at least 2 weeks. After 2 weeks you can see about using just as needed. If symptoms persist or worsen despite the pantoprazole, notify the office."  -     pantoprazole (PROTONIX) 40 MG tablet; Take 1 tablet (40 mg total) by mouth before dinner.    Need for 23-polyvalent pneumococcal polysaccharide vaccine    Follow up in about 1 year (around 4/9/2020) for EPP annual exam, fasting labs 1 week prior.  Santy Momin MD  Internal Medicine    Portions of this note were completed using medical dictation software. Please excuse typographical or syntax errors that were missed on review.    "

## 2019-04-09 NOTE — PATIENT INSTRUCTIONS
Try starting the omeprazole 20-30 min before dinner until pantoprazole arrives. Use pantoprazole for at least 2 weeks. After 2 weeks you can see about using just as needed. If symptoms persist or worsen despite the pantoprazole, notify the office.

## 2019-04-24 ENCOUNTER — CLINICAL SUPPORT (OUTPATIENT)
Dept: REHABILITATION | Facility: HOSPITAL | Age: 69
End: 2019-04-24
Attending: INTERNAL MEDICINE
Payer: MEDICARE

## 2019-04-24 DIAGNOSIS — G89.29 CHRONIC RIGHT-SIDED LOW BACK PAIN WITHOUT SCIATICA: ICD-10-CM

## 2019-04-24 DIAGNOSIS — M54.50 CHRONIC RIGHT-SIDED LOW BACK PAIN WITHOUT SCIATICA: ICD-10-CM

## 2019-04-24 DIAGNOSIS — Z74.09 IMPAIRED MOBILITY: ICD-10-CM

## 2019-04-24 DIAGNOSIS — M53.3 SACRAL PAIN: Primary | ICD-10-CM

## 2019-04-24 PROCEDURE — 97110 THERAPEUTIC EXERCISES: CPT | Mod: HCNC,PN

## 2019-04-24 PROCEDURE — G8978 MOBILITY CURRENT STATUS: HCPCS | Mod: CJ,HCNC,PN

## 2019-04-24 PROCEDURE — 97161 PT EVAL LOW COMPLEX 20 MIN: CPT | Mod: HCNC,PN

## 2019-04-24 PROCEDURE — G8979 MOBILITY GOAL STATUS: HCPCS | Mod: CJ,HCNC,PN

## 2019-04-24 NOTE — PATIENT INSTRUCTIONS
PUSH/PULL Pelvic Correction      Wrap R hand around R knee and place L hand on top of L knee while lying on back. Pull R knee down towards mat, R hand applying counter force to prevent movement. Push L knee towards you into L hand, also applying counter force to prevent movement. Do not perform with knees together.   Hold 8-10 seconds. Repeat 3 times. Perform 3-4 times a day or when familiar pain occurs.

## 2019-04-24 NOTE — PLAN OF CARE
[facility]  Physical Therapy Initial Evaluation    Name: Ana Lilia Glass  Clinic Number: [id number]    Therapy Diagnosis:   Encounter Diagnoses   Name Primary?    Sacral pain Yes    Chronic right-sided low back pain without sciatica     Impaired mobility      Physician: Santy Momin MD    Physician Orders: PT Eval and Treat   Medical Diagnosis:   M54.5 (ICD-10-CM) - Acute right-sided low back pain without sciatica   M25.552 (ICD-10-CM) - Left hip pain     Evaluation Date: 4/24/2019  Authorization Period Expiration: 12/31/2019  Plan of Care Certification Period: 4/24/2019 to 6/7/2019  Visit # / Visits authorized: 1/50  FOTO: 1/10    Time In: 1113  Time Out: 1200  Total Billable Time: 47 minutes    Precautions: Standard    Subjective     Date of onset: 6 months    History of current condition - Ana Lilia reports: insidious R sided low back pain that has remained constant since onset. Movement relives it. Increases with rolling to alternate sidelying, getting out of bed in the morning, and sit>stands greatest after prolonged sitting durations. Patient with chronic history of L hip bursitis however denies relation to current pain. History of steroid injections to L hip, last one a year ago which has relieved pain. Patient think this pain may come back soon however.     Past Medical History:   Diagnosis Date    Colon polyps     Hiatal hernia     Osteoporosis      Ana Lilia Glass  has a past surgical history that includes Total thyroidectomy (1997); Colonoscopy (2011); Hemorrhoid surgery (12/2016); and Hysterectomy (06/2016).    Ana Lilia has a current medication list which includes the following prescription(s): biotin, bupropion, butalbital-acetaminophen-caffeine -40 mg, calcium carbonate/vitamin d3, escitalopram oxalate, eszopiclone, fexofenadine-pseudoephedrine  mg, fluticasone, levothyroxine, linzess, and pantoprazole.    Review of patient's allergies indicates:    Allergen Reactions    Bactrim [sulfamethoxazole-trimethoprim] Other (See Comments)     Unknown    Codeine Itching and Rash    Sulfa (sulfonamide antibiotics) Other (See Comments)     Pt was 5yrs old      Prior Therapy: for L hip bursitis; not for low back pain  Social History: Patient lives alone in 2 story house  Occupation: Retired  Prior Level of Function: Indedpendent  Current Level of Function: Independent; pain and stiffness upon getting out of bed and with sit<>stands    Pain:  Current 0/10, worst 5/10, best 0/10   Location: R side of low back  Description: Dull, ache  Aggravating Factors: Rolling to alternate sidelying, getting out of bed in the morning, and sit>stands more greatest prolonged sitting durations  Easing Factors: Movement    Pts goals: Get rid of pain    Objective     Posture: R trunk lean; elevated R iliac crest and PSIS  Palpation: Tenderness to B PSIS, R>L; tenderness to R lumbar erectors and over L4-5 spinous processes    Range of Motion/Strength:     Lumbar AROM: Pain/Dysfunction with Movement:   Flexion Normal, no pain   Extension Moderate loss; pain to R side of sacrum, decreased following 10 reps   Right side bending Minimal loss; pain to R side of sacrum, decreased following 10 reps   Left side bending Minimal loss, stretch to R side of low back   Right rotation Normal, no pain   Left rotation Normal, no pain     Hip  Right   Left  Pain/Dysfunction with Movement    AROM PROM MMT AROM PROM MMT    Flexion WNL WNL! 4+/5 WNL WNL! 4+/5 Pain to R side of sacrum   Extension WNL NT 4/5 WNL NT 4-/5! Pain to R side of sacrum   Abduction WNL NT 4/5 WNL NT 4/5    Adduction WNL NT 4+/5 WNL NT 3+/5    Internal rotation WNL WNL 4+/5 WNL WNL! 4+/5 Pain to R side of sacrum   External rotation WNL WNL! 4+/5 WNL WNL 4+/5 Pain to R side of hip      Knee  Right   Left  Pain/Dysfunction with Movement    AROM PROM MMT AROM PROM MMT    Flexion WNL NT 5/5 WNL NT 5/5    Extension WNL NT 5/5 WNL NT 5/5   "    Flexibility: 9090 hamstring length: moderate impairment R, mild impairment L  Gait: no AD  Analysis: WNL  Special tests: Prone knee bend (+) R for R sacral pain, (-) B for flexibility impairment  SI compression: (-) B  SI distraction: (-)  Sacral thrust: (+) for pain  FADIR (+) L for R sacral pain, (-) R  RAFY (+) R for R hip pain, (-) L  Long sitting test: (+) for R anterior rotated innominate    Other: Increased R sacral pain with prone knee flexion, greater on L    CMS Impairment/Limitation/Restriction for FOTO Lumbar Spine Survey    Therapist reviewed FOTO scores for Ana Lilia Glass on 4/24/2019.   FOTO documents entered into [employer] - see Media section.    Limitation Score: 31%  Category: Mobility    Current : CJ = at least 20% but < 40% impaired, limited or restricted  Goal: CJ = at least 20% but < 40% impaired, limited or restricted     TREATMENT     Treatment Time In: 1150  Treatment Time Out: 1200  Total Treatment time separate from Evaluation time: 10 minutes    Ana Lilia participated in neuromuscular re-education activities to improve: kinesthetic sense, proprioception and posture for 10 minutes. The following activities were included:  9090 R hamstring isometric and hemibridge + L hip lift: 3 reps of 10" holds, 3 trials  Push/pull muscle energy technique: 3 reps of 10" holds, 3 trials    Home Exercises and Patient Education Provided    Education provided:   - mechanisms underlying current pain; role of therapy to address  - HEP of neuromuscular re-education activities    Written Home Exercises Provided: yes.  Exercises were reviewed and Ana Lilia was able to demonstrate them prior to the end of the session.  Ana Lilia demonstrated good  understanding of the education provided.     See EMR under Patient Instructions for exercises provided 4/24/2019.    Assessment     Ana Lilia is a 68 y.o. female referred to outpatient Physical Therapy with a medical diagnosis of acute R sided low back pain and L " hip pain. Pt presents to PT with signs and symptoms consistent with sacroiliac joint dysfunction. Lower lumbar hypomobility which may also contribute to symptoms. Repeated motion into painful lumbar range resulted in decreased pain.     Pt prognosis is Excellent.   Pt will benefit from skilled outpatient Physical Therapy to address the deficits stated above and in the chart below, provide pt/family education, and to maximize pt's level of independence.     Plan of care discussed with patient: Yes  Pt's spiritual, cultural and educational needs considered and pt agreeable to plan of care and goals as stated below:     Anticipated Barriers for therapy: none    Medical Necessity is demonstrated by the following  History  Co-morbidities and personal factors that may impact the plan of care Co-morbidities:   Low back pain    Personal Factors:   no deficits   Exercise prior to onset: Patient completed 20 minutes of exercise at least three (3) times a week  Prescription medicine: Patient is not taking prescription medicine for this condition     low   Examination  Body Structures and Functions, activity limitations and participation restrictions that may impact the plan of care Body Regions:   back  lower extremities    Body Systems:    gross symmetry  ROM  strength  gross coordinated movement  transfers  transitions  motor control  motor learning    Participation Restrictions:   non    Activity limitations:   Learning and applying knowledge  no deficits    General Tasks and Commands  no deficits    Communication  no deficits    Mobility  Rolling to alternate sidelying positiong, getting out of bed in the morning, and sit>stands more so after prolonged sitting durations    Self care  no deficits    Domestic Life  no deficits    Interactions/Relationships  no deficits    Life Areas  no deficits    Community and Social Life  no deficits         moderate   Clinical Presentation stable and uncomplicated low   Decision Making/  Complexity Score: low     Goals:  Short Term Goals (3 Weeks):   1. Pt will be compliant with HEP to supplement PT in restoring pain free function.  2. Pt will report 50% improvement in pain with transfers to promote QOL  Long Term Goals (6 Weeks):   1. Pt will improve FOTO score to </= 23% limited to decrease perceived limitation with mobility  2. Pt will report 100% improvement in pain with transfers to promote QOL  3. Pt will demonstrate maintenance of pelvic neutral independently upon arrival to treatment session indicating improved facilitation of pelvic stability muscles.  4. Pt will perform lumbar AROM without pain to promote functional mobility.     Plan     Certification Period: 4/24/2019 to 6/7/2019.  2Outpatient Physical Therapy 2 times weekly for 6 weeks to include the following interventions: Gait Training, Manual Therapy, Moist Heat/ Ice, Neuromuscular Re-ed, Patient Education, Therapeutic Activites and Therapeutic Exercise.     Nora Ray, PT, DPT

## 2019-04-26 ENCOUNTER — DOCUMENTATION ONLY (OUTPATIENT)
Dept: INTERNAL MEDICINE | Facility: CLINIC | Age: 69
End: 2019-04-26

## 2019-04-26 NOTE — PROGRESS NOTES
Received outside records from outside Gastroenterology, chart updated as appropriate, results will be scanned into EPIC.    Briefly, pt underwent colonoscopy in 2016, polyps found but negative for high-grade dysplasia, plan repeat in 5 years.    Santy Momin MD  Internal Medicine    Portions of this note were completed using medical dictation software. Please excuse typographical or syntax errors.

## 2019-05-10 ENCOUNTER — PATIENT MESSAGE (OUTPATIENT)
Dept: INTERNAL MEDICINE | Facility: CLINIC | Age: 69
End: 2019-05-10

## 2019-05-10 DIAGNOSIS — F32.A DEPRESSION, UNSPECIFIED DEPRESSION TYPE: ICD-10-CM

## 2019-05-10 DIAGNOSIS — R51.9 NONINTRACTABLE EPISODIC HEADACHE, UNSPECIFIED HEADACHE TYPE: ICD-10-CM

## 2019-05-10 RX ORDER — BUPROPION HYDROCHLORIDE 150 MG/1
150 TABLET, EXTENDED RELEASE ORAL 2 TIMES DAILY
Qty: 180 TABLET | Refills: 0 | Status: SHIPPED | OUTPATIENT
Start: 2019-05-10 | End: 2020-06-12

## 2019-05-13 RX ORDER — BUTALBITAL, ACETAMINOPHEN AND CAFFEINE 50; 325; 40 MG/1; MG/1; MG/1
1 TABLET ORAL EVERY 6 HOURS PRN
Qty: 30 TABLET | Refills: 2 | Status: SHIPPED | OUTPATIENT
Start: 2019-05-13 | End: 2021-06-23

## 2019-05-15 ENCOUNTER — PATIENT MESSAGE (OUTPATIENT)
Dept: INTERNAL MEDICINE | Facility: CLINIC | Age: 69
End: 2019-05-15

## 2019-07-05 ENCOUNTER — DOCUMENTATION ONLY (OUTPATIENT)
Dept: REHABILITATION | Facility: HOSPITAL | Age: 69
End: 2019-07-05

## 2019-07-05 DIAGNOSIS — G89.29 CHRONIC RIGHT-SIDED LOW BACK PAIN WITHOUT SCIATICA: ICD-10-CM

## 2019-07-05 DIAGNOSIS — Z74.09 IMPAIRED MOBILITY: ICD-10-CM

## 2019-07-05 DIAGNOSIS — M54.50 CHRONIC RIGHT-SIDED LOW BACK PAIN WITHOUT SCIATICA: ICD-10-CM

## 2019-07-05 DIAGNOSIS — M53.3 SACRAL PAIN: ICD-10-CM

## 2019-07-05 NOTE — PROGRESS NOTES
Pt was evaluated on 2019 and was seen 1 times for PT. Pt has not attended PT since initial evaluation; was given HEP. Plan of care  2019. Current status is unknown. Pt to be d/c'd at this time.

## 2019-12-23 ENCOUNTER — TELEPHONE (OUTPATIENT)
Dept: INTERNAL MEDICINE | Facility: CLINIC | Age: 69
End: 2019-12-23

## 2019-12-23 NOTE — TELEPHONE ENCOUNTER
----- Message from Harjeet Cole sent at 12/23/2019  8:37 AM CST -----  Contact: Pt [phone]  The computer won't let me make a lab appointment. It given me a pop up box that says something is wrong with the CBC auto differential lab. Please call patient after it is fixed.    Thank you

## 2020-02-26 ENCOUNTER — PES CALL (OUTPATIENT)
Dept: ADMINISTRATIVE | Facility: CLINIC | Age: 70
End: 2020-02-26

## 2020-03-30 ENCOUNTER — PATIENT MESSAGE (OUTPATIENT)
Dept: INTERNAL MEDICINE | Facility: CLINIC | Age: 70
End: 2020-03-30

## 2020-05-29 ENCOUNTER — PATIENT OUTREACH (OUTPATIENT)
Dept: ADMINISTRATIVE | Facility: HOSPITAL | Age: 70
End: 2020-05-29

## 2020-05-29 ENCOUNTER — PATIENT MESSAGE (OUTPATIENT)
Dept: ADMINISTRATIVE | Facility: HOSPITAL | Age: 70
End: 2020-05-29

## 2020-05-29 DIAGNOSIS — M81.0 OSTEOPOROSIS, UNSPECIFIED OSTEOPOROSIS TYPE, UNSPECIFIED PATHOLOGICAL FRACTURE PRESENCE: Primary | ICD-10-CM

## 2020-06-01 DIAGNOSIS — Z12.39 SCREENING FOR BREAST CANCER: Primary | ICD-10-CM

## 2020-06-01 DIAGNOSIS — Z12.31 ENCOUNTER FOR SCREENING MAMMOGRAM FOR BREAST CANCER: ICD-10-CM

## 2020-06-05 ENCOUNTER — LAB VISIT (OUTPATIENT)
Dept: LAB | Facility: HOSPITAL | Age: 70
End: 2020-06-05
Attending: INTERNAL MEDICINE
Payer: MEDICARE

## 2020-06-05 DIAGNOSIS — E89.0 POSTOPERATIVE HYPOTHYROIDISM: ICD-10-CM

## 2020-06-05 DIAGNOSIS — Z13.6 SCREENING FOR CARDIOVASCULAR CONDITION: ICD-10-CM

## 2020-06-05 LAB
ALBUMIN SERPL BCP-MCNC: 3.9 G/DL (ref 3.5–5.2)
ALP SERPL-CCNC: 79 U/L (ref 55–135)
ALT SERPL W/O P-5'-P-CCNC: 16 U/L (ref 10–44)
ANION GAP SERPL CALC-SCNC: 8 MMOL/L (ref 8–16)
AST SERPL-CCNC: 19 U/L (ref 10–40)
BILIRUB SERPL-MCNC: 0.5 MG/DL (ref 0.1–1)
BUN SERPL-MCNC: 11 MG/DL (ref 8–23)
CALCIUM SERPL-MCNC: 9.8 MG/DL (ref 8.7–10.5)
CHLORIDE SERPL-SCNC: 108 MMOL/L (ref 95–110)
CHOLEST SERPL-MCNC: 222 MG/DL (ref 120–199)
CHOLEST/HDLC SERPL: 3.3 {RATIO} (ref 2–5)
CO2 SERPL-SCNC: 27 MMOL/L (ref 23–29)
CREAT SERPL-MCNC: 0.8 MG/DL (ref 0.5–1.4)
ERYTHROCYTE [DISTWIDTH] IN BLOOD BY AUTOMATED COUNT: 13.3 % (ref 11.5–14.5)
EST. GFR  (AFRICAN AMERICAN): >60 ML/MIN/1.73 M^2
EST. GFR  (NON AFRICAN AMERICAN): >60 ML/MIN/1.73 M^2
GLUCOSE SERPL-MCNC: 101 MG/DL (ref 70–110)
HCT VFR BLD AUTO: 45.4 % (ref 37–48.5)
HDLC SERPL-MCNC: 67 MG/DL (ref 40–75)
HDLC SERPL: 30.2 % (ref 20–50)
HGB BLD-MCNC: 14.4 G/DL (ref 12–16)
LDLC SERPL CALC-MCNC: 138.4 MG/DL (ref 63–159)
MCH RBC QN AUTO: 30.1 PG (ref 27–31)
MCHC RBC AUTO-ENTMCNC: 31.7 G/DL (ref 32–36)
MCV RBC AUTO: 95 FL (ref 82–98)
NONHDLC SERPL-MCNC: 155 MG/DL
PLATELET # BLD AUTO: 309 K/UL (ref 150–350)
PMV BLD AUTO: 10.5 FL (ref 9.2–12.9)
POTASSIUM SERPL-SCNC: 4.4 MMOL/L (ref 3.5–5.1)
PROT SERPL-MCNC: 6.7 G/DL (ref 6–8.4)
RBC # BLD AUTO: 4.78 M/UL (ref 4–5.4)
SODIUM SERPL-SCNC: 143 MMOL/L (ref 136–145)
T4 FREE SERPL-MCNC: 1.42 NG/DL (ref 0.71–1.51)
TRIGL SERPL-MCNC: 83 MG/DL (ref 30–150)
TSH SERPL DL<=0.005 MIU/L-ACNC: 0.03 UIU/ML (ref 0.4–4)
WBC # BLD AUTO: 4.96 K/UL (ref 3.9–12.7)

## 2020-06-05 PROCEDURE — 80053 COMPREHEN METABOLIC PANEL: CPT | Mod: HCNC

## 2020-06-05 PROCEDURE — 36415 COLL VENOUS BLD VENIPUNCTURE: CPT | Mod: HCNC

## 2020-06-05 PROCEDURE — 85027 COMPLETE CBC AUTOMATED: CPT | Mod: HCNC

## 2020-06-05 PROCEDURE — 84443 ASSAY THYROID STIM HORMONE: CPT | Mod: HCNC

## 2020-06-05 PROCEDURE — 80061 LIPID PANEL: CPT | Mod: HCNC

## 2020-06-05 PROCEDURE — 84439 ASSAY OF FREE THYROXINE: CPT | Mod: HCNC

## 2020-06-10 NOTE — TELEPHONE ENCOUNTER
Labs are in  Please [facility]   Thanks    Labs scheduled, patient was notified.   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 He was last seen for follow up in [facility] clinic on 6/5/2020 at which time, unfortunately, his lab work confirmed relapse of his leukemia. He is here today in clinic with his father for history and physical prior to planned procedures in anticipation of undergoing apheresis collection per CART (Kymriah) protocol [id number]. Procedures today include port placement, temporary apheresis catheter placement, bone marrow biopsy and lumbar puncture.      Prior to his visit here in clinic on 6/5/2020,  Artemio was last seen in Nov 2019 and Ruxolitinib was discontinued at that visit. He had not had any follow up in the interim. Artemio has not had any infections or illnesses and has not required hospital admission. He did report dizzyness on standing in the last 2-3 weeks, without LOC or falls.  In the last 1-2 weeks he has also had daily, mild, occipital headaches, without any visual changes. He did not need to take any analgesia and headaches did not interfere with his day to day activities. The petechial rash he had on 6/5 has resolved following recent platelet transfusions at [facility]. He has not had any bruising or bleeding. He also had pRBC transfusion at [facility] as his hemoglobin was 5.3 on 6/5.  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 Artemio is currently living his grandparents, which he reports is going well. 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 He denies smoking, including marijuana and drinking alcohol.     At his clinic visit on 6/5 with Dr. Zavala and Dr. Camryn Mao  (BMT fellow), the treatment options were discussed, including CAR-T cell therapy or a second stem cell transplant. Artemio has decided to pursue CAR-T which is appropriate if his relapsed disease is CD19 positive. A bone marrow biopsy and lumbar puncture will be performed today in order to know what the leukemia burden is prior to receiving the product. He would likely then receive chemotherapy with fludarabine and Cytoxan.    Post procedures today and apheresis collection, Artemio will continue care with interim therapy with [facility] to maintain disease control and potentially reduce disease burden, rather than high intensity therapy to achieve remission. Adán Zavala and Mayco also discussed that there is still a risk of relapse post CART. If he was MRD positive post CART or relapsed, we would recommend a second transplant.    Artemio has not had any concerning change to his health since seen here in clinic on 6/5/2020. He is well appearing, no fevers, no URI symptoms. No nausea, vomiting or diarrhea. He is eating a regular diet.      ROS: A complete review of systems is negative except as noted in HPI    Past Medical History  Past Medical History:   Diagnosis Date     ALL (acute lymphoblastic leukemia of infant) (H)      History of blood transfusion      WPW (Aaron-Parkinson-White syndrome) 03/2018       Past Surgical History  Past Surgical History:   Procedure Laterality Date     BONE MARROW BIOPSY, BONE SPECIMEN, NEEDLE/TROCAR Right 11/27/2018    Procedure: bone marrow biopsy;  Surgeon: Jacqueline Fitzgerald NP;  [facility]      BONE MARROW BIOPSY, BONE SPECIMEN, NEEDLE/TROCAR N/A 2/8/2019    Procedure: BIOPSY BONE MARROW;  Surgeon: Lisa Workman NP;  [facility]: [facility]      BONE MARROW BIOPSY, BONE SPECIMEN, NEEDLE/TROCAR N/A 5/16/2019    Procedure: BIOPSY, BONE MARROW;  Surgeon: Lilia López APRN CNP;  Location: [facility]     BONE MARROW BIOPSY, BONE SPECIMEN,  NEEDLE/TROCAR N/A 11/7/2019    Procedure: Bone marrow biopsy;  Surgeon: Jacqueline Shin NP;  Location: [facility]      ESOPHAGOSCOPY, GASTROSCOPY, DUODENOSCOPY (EGD), COMBINED N/A 2/22/2019    Procedure: Upper endoscopy with biopsy;  Surgeon: Magdalene Hobson MD;  [facility]: [facility]      INSERT CATHETER VASCULAR ACCESS DOUBLE LUMEN CHILD N/A 10/26/2018    Procedure: double lumen tunneled line placement;  Surgeon: Rex Valente MD;  [facility]: [facility]      IR CVC TUNNEL REMOVAL LEFT  2/8/2019     IR PORT REMOVAL LEFT  5/16/2019     O CLAVICLE LEFT Left     fracture with surgical repair and plate/screws     ORTHOPEDIC SURGERY      femur     REMOVE CATHETER VASCULAR ACCESS N/A 2/8/2019    Procedure: Tunneled line removal;  Surgeon: Krystal Esparza MD;  [facility]: [facility]      REMOVE PORT VASCULAR ACCESS N/A 5/16/2019    Procedure: REMOVAL, VASCULAR ACCESS PORT;  Surgeon: Link Rios PA-C;  Location: [facility]       Family History   Unchanged from previous    Social History   Lives with his grandmother. 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 Had planned to move to [place], where his mother is, and to work in her Cookie business. His moved was initially delayed due to border closures with COVID.     Medications  cephALEXin (KEFLEX) 500 MG capsule, Take 1 capsule (500 mg) by mouth 3 times daily (Patient not taking: Reported on 6/5/2020)  metroNIDAZOLE (METROCREAM) 0.75 % external cream, Apply topically 2 times daily (Patient not taking: Reported on 6/5/2020)  mupirocin (BACTROBAN) 2 % external ointment, Use 2 times a day to affected area. (Patient not taking: Reported on 6/5/2020)  pantoprazole (PROTONIX) 40 MG EC tablet, Take 1 tablet (40 mg) by mouth 2 times daily  sulfamethoxazole-trimethoprim (BACTRIM DS/SEPTRA DS) 800-160 MG per tablet, Take 1 tablet by mouth Every Mon, Tues two times daily (Patient not taking: Reported on 11/7/2019)  tacrolimus (PROTOPIC) 0.1 %  external ointment, Apply topically 2 times daily (Patient not taking: Reported on 9/27/2019)  tretinoin (RETIN-A) 0.025 % external cream, Use every night (Patient not taking: Reported on 11/7/2019)    No current facility-administered medications on file prior to visit.       Allergies      Allergies   Allergen Reactions     Blood Transfusion Related (Informational Only) Other (See Comments)     Patient has a history of a clinically significant antibody against RBC antigens.  A delay in compatible RBCs may occur.Stem cell transplant patient.  Give type O RBCs.     No Known Drug Allergies        Physical Exam   Vital Signs for Peds 6/10/2020   SYSTOLIC 112   DIASTOLIC 70   PULSE 72   TEMPERATURE 98.6   RESPIRATIONS 16   WEIGHT (kg) 59.4 kg   HEIGHT (cm) 175.9 cm   BMI 19.2   pain    O2 99        Karnofsky is 100  GEN: Sitting on exam table. Awake and alert and in no apparent distress. Pale. Father present.  HEENT full head of hair.  Nares patent. MMM. OP clear, no lesions or erythema.  LYMPH: no adenopathy  CARD: Regular rate and rhythm. Normal S1 and S2, no murmurs, rubs or gallops. Cap refill <2 seconds.  RESP: Clear on auscultation, good bilateral air entry. No increased work of breathing, crackles or wheezes.   ABD: Non-distended, non-tender, no organomegaly  EXTREM: No edema noted  SKIN: No rashes, bruising or petechiae.    Labs  Results for orders placed or performed in visit on 06/09/20 (from the past 24 hour(s))   Asymptomatic COVID-19 Virus (Coronavirus) by PCR    Specimen: Nasopharyngeal   Result Value Ref Range    COVID-19 Virus PCR to [facility] - Source Nasopharyngeal     COVID-19 Virus PCR to [facility] - Result       Test received-See reflex to IDDL test SARS CoV2 (COVID-19) Virus RT-PCR   SARS-CoV-2 COVID-19 Virus (Coronavirus) RT-PCR Nasopharyngeal    Specimen: Nasopharyngeal   Result Value Ref Range    SARS-CoV-2 Virus Specimen Source Nasopharyngeal     SARS-CoV-2 PCR Result NEGATIVE     SARS-CoV-2 PCR  Comment       The Simplexa COVID-19 direct PCR assay by [employer] on the Liaison MDX instrument has been   given Emergency Use Authorization (EUA) for the in vitro qualitative detection of RNA from   the SARS-CoV2 virus in nasopharyngeal swabs in viral transport medium from patients with   signs and symptoms of infection who are suspected of COVID-19. Performance is unknown in   asymptomatic patients.         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 He has decided to pursue CAR-T therapy, his relapse is CD19 positive (dim).  He is here today with his father for HP prior to undergoing port placement, temporary apheresis catheter placement, BMB and LP. His ALC is > 500 today. He will commence apheresis collection starting tomorrow in [facility]. Upon successful collection, he will resume clinical care and undergo interim therapy back at [facility].    Artemio is clinically well today and appropriate for sedation.     ALL and BMT: High risk B cell ALL (CNS negative) + JAK2 activation. BMB prior to BMT(10/15) MRD 0.006%. Conditioning per 2015-29: TBI ( -4 thru -1) MSD BMT 11/2/19.   - Engrafted day +15, BMB 20-30% cellularity, trilineage hematopoiesis, flow negative,100% donor.   - Day +180 engraftment: CD33 - 100% donor, CD3 - 99% donor; BM no evidence of ALL.  - Day +360 engraftment: CD 33- 96% donor, CD3 - 99% donor; BM no evidence of ALL.   - Ruxolitinib discontinued on 11/6/19.   - Relapsed 6/5/2020, 19 months post a match related BMT per [id number] (cytoxan/TBI arm) with CD19 (dim) relapsed b-cell lymphoblastic leukemia.  - 6/10/20- BMB/LP to assess disease burden.  - Product manufacture expected to take about 1 month.    Apheresis:  - ALC > 500 today. Will have port and temporary apheresis catheter placed in peds sedation.   - Transfused at [facility] 6/5 and 6/8, hgb 13.1 and platelets 56k  today.  - Return to [facility] for apheresis Thursday and possibly Friday, collection goal >2 x 10^9 TNC and/or >1 x 10^9 CD3 (absolute, not weight based) per [id number] [facility] (CAR-T).   - Plan to transfuse for hgb 9-10 per apheresis  request  - Line removal is scheduled for Friday afternoon, could be removed earlier if he collects goal on Thursday. Likely will need platelet transfusion and line will be pulled in clinic.    Heme: Transfused at [facility] 6/5 and 6/8, hgb 13.1 and platelets 56k today (6/10).   - Plan to transfuse for hgb 9-10 per apheresis  request.   - Likely will need platelet transfusion Friday 6/12 in clinic.   - Post apheresis transfusion goals hgb > 7 and platelets > 10k. No pre meds needed.    GVHD: Post-transplant Cytoxan on days +3 and +4 and now off tacrolimus. No signs of GvHD to date.     FEN/Renal:  Regular diet. Normal renal and liver function on labs today (6/10).     GI:  Previous history of reflux and heartburn. Resolved.     ID: No current concerns.     Pulmonary:  History of pneumomediastinum: finding upon work up Chest CT (10/22/19), asymptomatic. Per radiology, likely benign, perhaps transient. No current pulmonary concerns.       Cardiovascular: Aaron-Parkinson-White Syndrome: diagnosed upon syncope in 02/2018. Asymptomatic, no interventions to date. Work up EKG c/w WPW, sinus bradycardia at 49 bpm. Work up ECHO normal with EF 68%.  No current concerns  11/6: Recent review with Cardiology in September 2019. EKG and echocardiogram repeated and no concerns noted. Recommended review with adult cardiology service in 2 years (due September 2021).  6/10: EKG today indicating WPW with normal sinus rhythm of 61.       Derm: Continue review with dermatology and topical therapy as per their recommendations. No current rashes. Dry skin with no topical therapies required.      Fertility: BMT and chemotherapy have an adverse effect on fertility, they do not necessarily cause  infertility. Contraception is recommended.     Disposition: Report to peds sedation for procedures. Clinic tomorrow for apheresis.    BENJA Chen  [facility]  Pediatric Blood and Marrow Transplant        Patient Active Problem List   Diagnosis     Acute lymphoblastic leukemia (ALL) in remission (H)     Aaron-Parkinson-White (WPW) syndrome     Bone marrow transplant candidate     ALL (acute lymphoblastic leukemia of infant) (H)     At risk for infection     S/P allogeneic bone marrow transplant (H)

## 2020-06-12 ENCOUNTER — OFFICE VISIT (OUTPATIENT)
Dept: INTERNAL MEDICINE | Facility: CLINIC | Age: 70
End: 2020-06-12
Payer: MEDICARE

## 2020-06-12 VITALS
HEIGHT: 66 IN | DIASTOLIC BLOOD PRESSURE: 60 MMHG | HEART RATE: 57 BPM | WEIGHT: 134 LBS | BODY MASS INDEX: 21.53 KG/M2 | SYSTOLIC BLOOD PRESSURE: 100 MMHG | OXYGEN SATURATION: 98 %

## 2020-06-12 DIAGNOSIS — G47.00 INSOMNIA, UNSPECIFIED TYPE: ICD-10-CM

## 2020-06-12 DIAGNOSIS — Z00.00 ANNUAL PHYSICAL EXAM: Primary | ICD-10-CM

## 2020-06-12 DIAGNOSIS — Z00.00 ANNUAL PHYSICAL EXAM: ICD-10-CM

## 2020-06-12 DIAGNOSIS — Z13.6 SCREENING FOR CARDIOVASCULAR CONDITION: ICD-10-CM

## 2020-06-12 DIAGNOSIS — E89.0 POSTOPERATIVE HYPOTHYROIDISM: ICD-10-CM

## 2020-06-12 PROCEDURE — 99499 RISK ADDL DX/OHS AUDIT: ICD-10-PCS | Mod: HCNC,S$GLB,, | Performed by: INTERNAL MEDICINE

## 2020-06-12 PROCEDURE — 99397 PR PREVENTIVE VISIT,EST,65 & OVER: ICD-10-PCS | Mod: S$GLB,,, | Performed by: INTERNAL MEDICINE

## 2020-06-12 PROCEDURE — 99397 PER PM REEVAL EST PAT 65+ YR: CPT | Mod: S$GLB,,, | Performed by: INTERNAL MEDICINE

## 2020-06-12 PROCEDURE — 99499 UNLISTED E&M SERVICE: CPT | Mod: HCNC,S$GLB,, | Performed by: INTERNAL MEDICINE

## 2020-06-12 PROCEDURE — 99999 PR PBB SHADOW E&M-EST. PATIENT-LVL IV: CPT | Mod: PBBFAC,,, | Performed by: INTERNAL MEDICINE

## 2020-06-12 PROCEDURE — 99999 PR PBB SHADOW E&M-EST. PATIENT-LVL IV: ICD-10-PCS | Mod: PBBFAC,,, | Performed by: INTERNAL MEDICINE

## 2020-06-12 RX ORDER — ESZOPICLONE 2 MG/1
2 TABLET, FILM COATED ORAL NIGHTLY PRN
Qty: 90 TABLET | Refills: 1 | Status: SHIPPED | OUTPATIENT
Start: 2020-06-12 | End: 2023-09-19 | Stop reason: SDUPTHER

## 2020-06-12 RX ORDER — ESZOPICLONE 2 MG/1
2 TABLET, FILM COATED ORAL NIGHTLY PRN
Qty: 90 TABLET | Refills: 1 | Status: SHIPPED | OUTPATIENT
Start: 2020-06-12 | End: 2020-06-12 | Stop reason: SDUPTHER

## 2020-06-12 RX ORDER — LEVOTHYROXINE SODIUM 300 UG/1
0.3 TABLET ORAL
Qty: 90 TABLET | Refills: 3 | Status: SHIPPED | OUTPATIENT
Start: 2020-06-12 | End: 2020-06-12 | Stop reason: SDUPTHER

## 2020-06-12 RX ORDER — LEVOTHYROXINE SODIUM 300 UG/1
0.3 TABLET ORAL
Qty: 90 TABLET | Refills: 3 | Status: SHIPPED | OUTPATIENT
Start: 2020-06-12 | End: 2020-09-11 | Stop reason: DRUGHIGH

## 2020-06-12 NOTE — PROGRESS NOTES
Subjective:       Patient ID: Ana Lilia Glass is a 69 y.o. female.    Chief Complaint: Annual Exam    Last visit with me 4/9/2019. Has done mammogram and DXA yearly, going to [facility] end of this month.    HPI    No new problems or issues. Has had significant weight loss with use of intermittent fasting, feels very good.    Reviewed PMH, PSH, SH, FH, allergies, and medications.     Review of Systems   Constitutional: Negative for activity change and unexpected weight change.   HENT: Negative for hearing loss, rhinorrhea and trouble swallowing.    Eyes: Positive for visual disturbance. Negative for discharge.   Respiratory: Negative for chest tightness and wheezing.    Cardiovascular: Negative for chest pain and palpitations.   Gastrointestinal: Positive for constipation. Negative for blood in stool, diarrhea and vomiting.   Endocrine: Negative for polydipsia and polyuria.   Genitourinary: Negative for difficulty urinating, dysuria, hematuria and menstrual problem.   Musculoskeletal: Positive for arthralgias. Negative for joint swelling and neck pain.   Neurological: Negative for weakness and headaches.   Psychiatric/Behavioral: Negative for confusion and dysphoric mood.       Objective:      Physical Exam   Constitutional: She is oriented to person, place, and time. No distress.   HENT:   Head: Atraumatic.   Right Ear: External ear normal.   Left Ear: External ear normal.   Eyes: Pupils are equal, round, and reactive to light. Right eye exhibits no discharge. Left eye exhibits no discharge.   Neck: Normal range of motion. No thyromegaly present.   Cardiovascular: Normal rate, regular rhythm and normal heart sounds.   Pulmonary/Chest: Effort normal and breath sounds normal. No stridor. She has no wheezes. She has no rales.   Musculoskeletal: She exhibits no edema or tenderness.   Lymphadenopathy:     She has no cervical adenopathy.   Neurological: She is alert and oriented to person, place, and time.   Skin: Skin is  "warm and dry. No rash noted.   Psychiatric: She has a normal mood and affect. Her behavior is normal.   Nursing note and vitals reviewed.      Vitals:    06/12/20 0908   BP: 100/60   BP Location: Right arm   Patient Position: Sitting   BP Method: Medium (Manual)   Pulse: (!) 57   SpO2: 98%   Weight: 60.8 kg (134 lb)   Height: 5' 6" (1.676 m)     Body mass index is 21.63 kg/m².    RESULTS: Reviewed labs from last 12 months    Assessment:       1. Annual physical exam    2. Postoperative hypothyroidism    3. Screening for cardiovascular condition    4. Insomnia, unspecified type        Plan:   Ana Lilia was seen today for annual exam.    Diagnoses and all orders for this visit:    Annual physical exam:  Age-appropriate health screening reviewed, indicated tests ordered.   -     levothyroxine (SYNTHROID) 300 MCG Tab; Take 1 tablet (300 mcg total) by mouth before breakfast.  -     TSH; Future  -     Lipid Panel; Future    Postoperative hypothyroidism:  Prior diagnosis, recent TSH decreased, possibly due to intermittent fasting and weight loss.   -     levothyroxine (SYNTHROID) 300 MCG Tab; Take 1 tablet (300 mcg total) by mouth before breakfast.  -     TSH; Future    Screening for cardiovascular condition  -     Lipid Panel; Future    Insomnia, unspecified type:  Prior diagnosis, stable, well controlled on current management of Lunesta PRN (not every night). No changes at this time, will continue to monitor.   -     eszopiclone (LUNESTA) 2 MG Tab; Take 1 tablet (2 mg total) by mouth nightly as needed.      Follow up in about 1 year (around 6/12/2021) for EPP annual exam, fasting labs 1 week prior. Will do labs in 3 months to make sure TSH within normal limits.  Santy Momin MD  Internal Medicine    Portions of this note were completed using medical dictation software. Please excuse typographical or syntax errors that were missed on review.      "

## 2020-06-12 NOTE — PATIENT INSTRUCTIONS
Eating habits that help lower cholesterol are ones that limit or avoid animal protein, namely meat, dairy, and eggs.  See if you can start decreasing the amount of these in your daily diet.  I usually encourage people to start by cutting down by half; for example, if you eat meat at two meals a day, see if you can cut it down to only 1 meal a day.  For meal ideas that avoid animal protein, please review the [employer] series of cookbooks.

## 2020-06-12 NOTE — TELEPHONE ENCOUNTER
----- Message from Angi Cornelius sent at 6/12/2020  1:02 PM CDT -----  Contact: Pt   Pt is requesting a call back regarding medication discussed today during appt     Pt can be reached at [phone]

## 2020-09-01 ENCOUNTER — PATIENT MESSAGE (OUTPATIENT)
Dept: INTERNAL MEDICINE | Facility: CLINIC | Age: 70
End: 2020-09-01

## 2020-09-11 ENCOUNTER — LAB VISIT (OUTPATIENT)
Dept: LAB | Facility: HOSPITAL | Age: 70
End: 2020-09-11
Attending: INTERNAL MEDICINE
Payer: MEDICARE

## 2020-09-11 DIAGNOSIS — Z13.6 SCREENING FOR CARDIOVASCULAR CONDITION: ICD-10-CM

## 2020-09-11 DIAGNOSIS — E89.0 POSTOPERATIVE HYPOTHYROIDISM: ICD-10-CM

## 2020-09-11 DIAGNOSIS — Z00.00 ANNUAL PHYSICAL EXAM: ICD-10-CM

## 2020-09-11 LAB
CHOLEST SERPL-MCNC: 224 MG/DL (ref 120–199)
CHOLEST/HDLC SERPL: 3.6 {RATIO} (ref 2–5)
HDLC SERPL-MCNC: 63 MG/DL (ref 40–75)
HDLC SERPL: 28.1 % (ref 20–50)
LDLC SERPL CALC-MCNC: 146.4 MG/DL (ref 63–159)
NONHDLC SERPL-MCNC: 161 MG/DL
T4 FREE SERPL-MCNC: 1.2 NG/DL (ref 0.71–1.51)
TRIGL SERPL-MCNC: 73 MG/DL (ref 30–150)
TSH SERPL DL<=0.005 MIU/L-ACNC: 0.05 UIU/ML (ref 0.4–4)

## 2020-09-11 PROCEDURE — 84443 ASSAY THYROID STIM HORMONE: CPT | Mod: HCNC

## 2020-09-11 PROCEDURE — 80061 LIPID PANEL: CPT | Mod: HCNC

## 2020-09-11 PROCEDURE — 84439 ASSAY OF FREE THYROXINE: CPT | Mod: HCNC

## 2020-09-11 PROCEDURE — 36415 COLL VENOUS BLD VENIPUNCTURE: CPT | Mod: HCNC

## 2020-10-12 ENCOUNTER — PATIENT MESSAGE (OUTPATIENT)
Dept: INTERNAL MEDICINE | Facility: CLINIC | Age: 70
End: 2020-10-12

## 2020-11-04 ENCOUNTER — OFFICE VISIT (OUTPATIENT)
Dept: UROGYNECOLOGY | Facility: CLINIC | Age: 70
End: 2020-11-04
Payer: MEDICARE

## 2020-11-04 DIAGNOSIS — R30.0 DYSURIA: ICD-10-CM

## 2020-11-04 DIAGNOSIS — Z87.440 HISTORY OF UTI: Primary | ICD-10-CM

## 2020-11-04 DIAGNOSIS — M79.18 MYALGIA OF PELVIC FLOOR: ICD-10-CM

## 2020-11-04 DIAGNOSIS — K59.09 CHRONIC CONSTIPATION: ICD-10-CM

## 2020-11-04 DIAGNOSIS — N95.2 VAGINAL ATROPHY: ICD-10-CM

## 2020-11-04 PROCEDURE — 51701 PR INSERTION OF NON-INDWELLING BLADDER CATHETERIZATION FOR RESIDUAL UR: ICD-10-PCS | Mod: HCNC,S$GLB,, | Performed by: OBSTETRICS & GYNECOLOGY

## 2020-11-04 PROCEDURE — 1159F MED LIST DOCD IN RCRD: CPT | Mod: HCNC,S$GLB,, | Performed by: OBSTETRICS & GYNECOLOGY

## 2020-11-04 PROCEDURE — 1126F AMNT PAIN NOTED NONE PRSNT: CPT | Mod: HCNC,S$GLB,, | Performed by: OBSTETRICS & GYNECOLOGY

## 2020-11-04 PROCEDURE — 99999 PR PBB SHADOW E&M-EST. PATIENT-LVL III: ICD-10-PCS | Mod: PBBFAC,HCNC,, | Performed by: OBSTETRICS & GYNECOLOGY

## 2020-11-04 PROCEDURE — 99205 OFFICE O/P NEW HI 60 MIN: CPT | Mod: 25,HCNC,S$GLB, | Performed by: OBSTETRICS & GYNECOLOGY

## 2020-11-04 PROCEDURE — 87086 URINE CULTURE/COLONY COUNT: CPT | Mod: HCNC

## 2020-11-04 PROCEDURE — 51701 INSERT BLADDER CATHETER: CPT | Mod: HCNC,S$GLB,, | Performed by: OBSTETRICS & GYNECOLOGY

## 2020-11-04 PROCEDURE — 1126F PR PAIN SEVERITY QUANTIFIED, NO PAIN PRESENT: ICD-10-PCS | Mod: HCNC,S$GLB,, | Performed by: OBSTETRICS & GYNECOLOGY

## 2020-11-04 PROCEDURE — 1159F PR MEDICATION LIST DOCUMENTED IN MEDICAL RECORD: ICD-10-PCS | Mod: HCNC,S$GLB,, | Performed by: OBSTETRICS & GYNECOLOGY

## 2020-11-04 PROCEDURE — 1101F PR PT FALLS ASSESS DOC 0-1 FALLS W/OUT INJ PAST YR: ICD-10-PCS | Mod: HCNC,CPTII,S$GLB, | Performed by: OBSTETRICS & GYNECOLOGY

## 2020-11-04 PROCEDURE — 1101F PT FALLS ASSESS-DOCD LE1/YR: CPT | Mod: HCNC,CPTII,S$GLB, | Performed by: OBSTETRICS & GYNECOLOGY

## 2020-11-04 PROCEDURE — 99999 PR PBB SHADOW E&M-EST. PATIENT-LVL III: CPT | Mod: PBBFAC,HCNC,, | Performed by: OBSTETRICS & GYNECOLOGY

## 2020-11-04 PROCEDURE — 99205 PR OFFICE/OUTPT VISIT, NEW, LEVL V, 60-74 MIN: ICD-10-PCS | Mod: 25,HCNC,S$GLB, | Performed by: OBSTETRICS & GYNECOLOGY

## 2020-11-04 RX ORDER — ESTRADIOL 0.1 MG/G
CREAM VAGINAL
Qty: 42.5 G | Refills: 11 | Status: SHIPPED | OUTPATIENT
Start: 2020-11-04 | End: 2022-11-17

## 2020-11-04 RX ORDER — NITROFURANTOIN (MACROCRYSTALS) 100 MG/1
CAPSULE ORAL
COMMUNITY
Start: 2020-11-01 | End: 2021-06-23

## 2020-11-04 NOTE — PATIENT INSTRUCTIONS
UTI PREVENTION: (from [employer])  Urinary Tract Infection (UTI)  More than 4 million doctor office visits per year in the [place] are for urinary tract infections (UTI). About 12% of men and 50% of women will have a UTI during his or her lifetime. Most UTIs arise from bacteria that normally live in the colon and rectum and are present in bowel movements. These bacteria cling to the opening of the urethra, begin to multiply, & travel up to the bladder. Urine flow from the bladder usually washes bacteria out of the body. However, because women have a shorter urethra than men, bacteria can reach the bladder more easily and settle into the bladder wall. This is why women are more likely to develop UTIs than men. This risk factor is exacerbated by the greater likelihood that women introduce bacteria from fecal matter, following a bowel movement, into the urinary tract. Less often, bacteria can spread to the kidney from the bloodstream. A recurrent UTI is classified as three or more a year.  Risk Factors for UTI  Several factors can contribute to the risk of UTI. Sexual intercourse, use of contraceptive spermicide, low levels of estrogen, catheterization, diabetes, pregnancy, and immune suppression increase susceptibility to UTI.  Naturally occurring estrogen helps prevent recurrent UTI in women. After menopause, estrogen levels drop along with the number of vaginal lactobacilli, the good bacteria which prevent growth of intestinal bacteria in the vagina. This makes postmenopausal women especially susceptible to UTI.  Catheters also present a risk of recurring UTI. Catheters are associated with colonization of bacteria and increased risks of clinical infection. Using the techniques described previously can help keep catheters clean and prevent recurrent UTI. While single-use of sterile catheters reduces the risks, it does not prevent UTI from occurring. It is therefore important to  maintain proper care and use of catheters at all times while remaining alert to symptoms of UTI.  Common Symptoms of UTI   Painful urination   Frequency   Urgency   Lower abdominal or pelvic pain or pressure   Blood in the urine   Milky, cloudy, or pink/red urine   Fever   Strong smelling urine  In the elderly populations, symptoms of a urinary tract infection, can be easily overlooked, causing a delay in diagnosis. Elderly people with a UTI are more likely than younger people not to be diagnosed until the complication of sepsis occurs. The elderly often do not experience or report obvious symptoms that younger people have, such as difficult urination, or frequent urination. Instead they may exhibit far more vague symptoms that are similiar to many disease or may be assumed to be due to the aging process. These symptoms include: confusion, feelings of general discomfort, disorientation, fatigue, weakness, behavior changes, falling, and/or a new, acute incontinence. In addition, because incontinence is common in the elderly, they may not be aware of the symptom of frequency. If you experience any of these symptoms, see your healthcare professional.  Types of UTIs   Cystitis - an inflammation of the bladder and the most common UTI. Almost always, it occurs in women. The infection typically affects only the surface of the bladder and is brief & acute.   Pyelonephritis - more commonly known as a kidney infection and usually occurs when a lower UTI spreads to the kidneys. Occasionally, bacteria will spread to the kidney from the bloodstresam. Kidney infections are more serious and less common than bladder infections. Symptoms include a fever, pain in the back or side below the ribs, nausea, or vomiting.   Urethritis - is an inflammation of the urethra. Men commonly contract urethritis through sexual intercourse with partners infected with sexually transmitted infections. Urethritis may also result from trauma  to the area or from the catheterization process.  Prevention of UTI  There are several ways to prevent bladder infections.  Bathroom Behavior:Periodically emptying the bladder by trying to urinate every two to three hours.  Diet:The use of cranberry products seems to decrease the ability of bacteria to adhere to the lining of the urethra and bladder. As cranberry juice can have a high amount of sugar, cranberry extract can be taken in capsule or pill form instead. Increasing water intake by one or two glasses per day may help limit the length of time that you have symptoms and reduce the infections.  Hygiene: Proper hygiene in caring for the urethral area is another way to limit the amount or type of bacteria that can be drawn into the urethra. This is especially true in people who have decreased sensation in the perineal region such as those with MS or who experience any amount of fecal incontinence. Using soap & water or commercially available cleansing wipes several times per day & frequent changing of incontinence pads as they become wet can minimize the amount of bacteria in the urethral area. Women should always wipe from the front of the vagina to the back of the anus after urination or a bowel movement and wear cotton underwear. It is also reported that wearing thong undergarments may increase one's risk of developing an infection.  Sexual Activity: Can be the source of UTIs in women. Insuring proper lubrication to the vagina and voiding before and after intercourse are tactics to help prevent infection. Using diaphragms and spermicidal jelly and/or foam may increase the risk of infection, so it is important to evaluate what type of birth control you use. Some physicians encourage women who have a history of recurrent infections to take an prophylactic antibiotic after intercourse, as it reduces risk of recurrent UTI by about 85%. At a minimum, restrict your number of sexual partners and urinate  immediately after sexual intimacy to lower the risk of recurrent UTIs.  Vaginal Estrogen: reduces risk of recurrent UTI by repopulating the normal vaginal lactobacilli that keep bacteria from the rectum from multiplying and causing a bladder infection. Forms of vaginal estrogen are available at very low dosages that have minimal systemic absorption.  Catheter Use: proper cleansing and storage of catheters is important in one who intermittently catheterizes, as the catheter can be a vehicle to introduce infection if the technique is incorrect or if the catheters are not properly cleaned between each use. A closed system catheter provides a reliable means of sterile IC because the introducer tip is surrounded by a urine collection bag and never exposed to bacteria typically found at the urethral opening. This greatly reduces the risk of infection.  NOTE: Vaginal estrogens and antibiotics are medications that need to be prescribed by your healthcare provider.  Treatment of UTI  Depending on the severity of infection, UTI can be treated with oral antibiotics. A three-day course of antibiotics can treat a simple UTI. However, some infections may need to be treated for several days or weeks. Length of antibiotic treatment also depends on the type of antibiotic prescribed.  Even if a few doses of medication relieve some symptoms, you should still complete the full course of medication prescribed by your doctor. Taking aspirin, Tylenol, or non-steroidal, anti-inflammatory medications may reduce symptoms during this episode, as they may be a result of increased body temperature.  For more information regarding UTIs please visit: http://www.ncbi.nlm.nih.gov/pubmedhealth/ANP6317001/    -------------------------------------------------------------------------------  1)  Recent UTI with persistent symptoms:  --finish macrobid  --urine C&S sent today  --If you feel like you have a UTI, please call our office so that we can  place an order for you to drop off a urine specimen at the closest [facility] lab (we will arrange).     --We will call in antibiotics for you to start right after you drop off specimen.     --In this way, we can determine:     1)  Do you have a UTI?      2) If you have a UTI, is it sensitive to the antibiotics we prescribed?   --follow UTI prevention tips (see attached)  --control bowel movements/fecal cross-contamination   --continue to control   --treat vaginal atrophy (dryness)  --empty bladder before and after intercourse  --consider need for further evaluation (pelvic imaging and cystoscopy) if issue persists    --you may have some persistence of UTI symptoms after infection cleared x 1-2 weeks after. If so, you can take uribel up to 4x/day or if uribel too expensive, can take AZO up to 3 times/day x 4 days. If symptoms still present or if they worsen while taking medication, let us know of go to urgent/care or ER.   --work on relaxing and then strengthening pelvic floor muscles (tender on exam today which can happen after having UTIs or other discomfort in pelvic area). Start pelvic floor PT. Call to make appt:  1)  [facility] (Kimberlyn Alberts): (p) [phone]. (f) [phone]. Established patients call:  [phone].  2)  [facility]: (p) [phone]  . (f) [phone].    3)  [facility] (Marianna Rosario or Kenia Joseph): (p) [phone].  Or [phone].   4)  [facility]. (p) [phone].     --this can also help mild urinary leakage    2)  Vaginal atrophy (dryness):  Use 0.5 gram of estrogen cream in vagina nightly x 2 weeks, then twice a week thereafter.   --this may also reduce UTIs and help bladder urgency/frequency    3)  RTC 3 months.

## 2020-11-04 NOTE — PROGRESS NOTES
[facility] [id number]   [address] [address]    Ana Lilia Glass  [id number]  1950  November 8, 2020    Consulting Physician: Leydi Jaimes   GYN: Ranjana Gabriel MD  Primary M.D.: Santy Momin MD    Chief Complaint   Patient presents with    Consult    Urinary Tract Infection       HPI:     1)  UI:  (--) STEFAN.  (+) UUI (only with UTI, mostly with running water). No pad use. Daytime frequency: Q 4 hours. With UTI: Qh, small amounts.  Nocturia: Yes: 1/night.   (+) dysuria (improved),  (--) hematuria, was evaluated for micro hematuria in past with NEG eval,  (--) frequent UTIs. (+) complete bladder emptying, except with UTI.     2)  POP:  Absent.  (--) vaginal bleeding. (--) vaginal discharge. (--) sexually active.  (--) h/o dyspareunia.  (+)  Vaginal dryness.  (--) vaginal estrogen use.     3)  BM:  (+) constipation/straining--takes mag citrate pills daily with good control. Was taking linzess but was too expensive.  (--) chronic diarrhea. (--) hematochezia.  (--) fecal incontinence.  (--) fecal smearing/urgency.  (+) complete evacuation.     4)  UTI:  --symptoms started 1 month ago; no inciting events  --saw GYN (Dr. Gabriel); was started on cipro & was then called/told she had UTI and cipro should work  --still feels like symptoms; had repeat UA but no repeat C&S  --because of continued symptoms, son-in-law (derm) sent in macrobid--she is mid course  --currently: symptoms are improved but continues to have increased urinary frequency and trouble initiating flow due to burning  --no h/o frequent UTIs    Past Medical History  Past Medical History:   Diagnosis Date    Colon polyps     Hiatal hernia     Internal hemorrhoids     Osteoporosis    allergy HA: used take fiorcet; none recently  Insomnia: takes lunesta 2x/month    Past Surgical History  Past Surgical History:   Procedure Laterality Date    APPENDECTOMY  2004    COLONOSCOPY  2011    polyp, repeat 5  years    HEMORRHOID SURGERY  2016    HYSTERECTOMY  2016    with BSO    TONSILLECTOMY, ADENOIDECTOMY      TOTAL THYROIDECTOMY     RLQ appy (ruptured, hosp x 3 weeks, states was septic)  Hemorrhoidectomy  PP BTL    Hysterectomy: Yes   Date: approx .  Indication: AUB.    Type: laparoscopic  Cervix present: No  Ovaries present: No  Other procedures at time of hysterectomy:  none    Past Ob History     x 2.  C/s x 0.    Largest infant weight: 6#.   no FAVD. yes episiotomy.      Gynecologic History  LMP: No LMP recorded. Patient is postmenopausal.  Age of menarche: 13 yo  Age of menopause: with TLH  Menstrual history: h/o normal  Pap test: post TLH.  History of abnormal paps: Yes - remote > 20 years ago.  History of STIs:  No  Mammogram: Date of last:  (DIS).  Result: Normal per report.   Colonoscopy: Date of last: .  Result:  Polyps per report.  Repeat due:  .    DEXA:  Date of last: a few years ago?  Result:  Osteopenia per report. Taking CA/D.  Repeat due:  .     Family History  Family History   Problem Relation Age of Onset    Heart attack Father 56    Hypertension Father     Alcohol abuse Father     Heart disease Father     Heart attack Paternal Uncle 49    Heart attack Cousin 59    Atrial fibrillation Cousin         and pacemaker    COPD Mother         smoker    Heart disease Mother     Alcohol abuse Brother     Heart disease Brother     Dementia Maternal Aunt     Dementia Maternal Grandmother     Heart disease Paternal Uncle     Cancer Neg Hx     Diabetes Neg Hx     Kidney disease Neg Hx     Stroke Neg Hx       Colon CA: No  Breast CA: No  GYN CA: No   CA: No    Social History  Social History     Tobacco Use   Smoking Status Former Smoker    Packs/day: 0.00    Years: 0.00    Pack years: 0.00    Types: Cigarettes    Quit date: 1997    Years since quittin.8   Smokeless Tobacco Never Used     Social History     Substance and Sexual Activity    Alcohol Use Not Currently    Frequency: Never    Binge frequency: Never    Comment: quit drinking alcohol 7/1/19   .    Social History     Substance and Sexual Activity   Drug Use No     The patient is single.  Resides in [place] [address].  Employment status: retired .     Allergies  Review of patient's allergies indicates:   Allergen Reactions    Bactrim [sulfamethoxazole-trimethoprim] Other (See Comments)     Unknown    Codeine Itching and Rash    Sulfa (sulfonamide antibiotics) Other (See Comments)     Pt was 5yrs old       Medications  Current Outpatient Medications on File Prior to Visit   Medication Sig Dispense Refill    BIOTIN ORAL Take 1 tablet by mouth once daily.      butalbital-acetaminophen-caffeine -40 mg (FIORICET, ESGIC) -40 mg per tablet Take 1 tablet by mouth every 6 (six) hours as needed for Pain. 30 tablet 2    CALCIUM CARBONATE/VITAMIN D3 (VITAMIN D-3 ORAL) Take 1 capsule by mouth once daily.      eszopiclone (LUNESTA) 2 MG Tab Take 1 tablet (2 mg total) by mouth nightly as needed. 90 tablet 1    fexofenadine-pseudoephedrine  mg (ALLEGRA-D)  mg per tablet Take 1 tablet by mouth daily as needed.      fluticasone (FLONASE) 50 mcg/actuation nasal spray 1 spray (50 mcg total) by Each Nare route once daily. 16 g 2    levothyroxine (SYNTHROID) 125 MCG tablet Take 2 tablets (250 mcg total) by mouth before breakfast. 180 tablet 1    nitrofurantoin (MACRODANTIN) 100 MG capsule TK 1 C PO BID       No current facility-administered medications on file prior to visit.        Review of Systems A 14 point ROS was reviewed with pertinent positives as noted above in the history of present illness.      Constitutional: negative  Eyes: negative  Endocrine: negative  Gastrointestinal: negative  Cardiovascular: negative  Respiratory: negative  Allergic/Immunologic: negative  Integumentary: negative  Psychiatric: negative  Musculoskeletal: negative   Ear/Nose/Throat:  negative  Neurologic: negative  Genitourinary: SEE HPI  Hematologic/Lymphatic: negative   Breast: negative    Urogynecologic Exam  There were no vitals taken for this visit.    GENERAL APPEARANCE:  The patient is well-developed, well-nourished.  Neck:  Supple with no thyromegaly, no carotid bruits.  Heart:  Regular rate and rhythm, no murmurs, rubs or gallops.  Lungs:  Clear.  No CVA tenderness.  Abdomen:  Soft, nontender, nondistended, no hepatosplenomegaly.  Incisions:  LSC well-healed    PELVIC:    External genitalia:  Normal Bartholins, Skenes and labia bilaterally.    Urethra:  No caruncle, diverticulum or masses.  (+) hypermobility.    Vagina:  Atrophy (+) , + mild TTP at bladder, no masses or tender, no discharge.  +TTP LV B--may feel like UTI symptoms.   Cervix:  normal appearance  Uterus: uterus absent  Adnexa: Not palpable.    POP-Q:    Deferred.  No obvious POP present with valsalva.     NEUROLOGIC:  Cranial nerves 2 through 12 intact.  Strength 5/5.  DTRs 2+ lower extremities.  S2 through 4 normal.  Sacral reflexes intact.    EXT: GILMORE, 2+ pulses bilaterally, no C/C/E    COUGH STRESS TEST:  negative  KEGEL: 1 /5    RECTAL:    External:  Normal, (--) hemorrhoids, (--) dovetailing.   Internal:   deferred    PVR: 60 mL    Impression    1. History of UTI    2. Dysuria    3. Vaginal atrophy    4. Chronic constipation    5. Myalgia of pelvic floor        Initial Plan  The patient was counseled regarding these issues. The patient was given a summary sheet containing each of these issues with possible options for evaluation and management. When appropriate, we also reviewed computer-generated diagrams specific to their diagnoses..  All questions were addressed to the patient's satisfaction.    1)  Recent UTI with persistent symptoms:  --finish macrobid  --urine C&S sent today  --If you feel like you have a UTI, please call our office so that we can place an order for you to drop off a urine specimen at the  closest [facility] lab (we will arrange).     --We will call in antibiotics for you to start right after you drop off specimen.     --In this way, we can determine:     1)  Do you have a UTI?      2) If you have a UTI, is it sensitive to the antibiotics we prescribed?   --follow UTI prevention tips (see attached)  --control bowel movements/fecal cross-contamination   --continue to control   --treat vaginal atrophy (dryness)  --empty bladder before and after intercourse  --consider need for further evaluation (pelvic imaging and cystoscopy) if issue persists    --you may have some persistence of UTI symptoms after infection cleared x 1-2 weeks after. If so, you can take uribel up to 4x/day or if uribel too expensive, can take AZO up to 3 times/day x 4 days. If symptoms still present or if they worsen while taking medication, let us know of go to urgent/care or ER.   --work on relaxing and then strengthening pelvic floor muscles (tender on exam today which can happen after having UTIs or other discomfort in pelvic area). Start pelvic floor PT. Call to make appt:  1)  [facility] (Kimberlyn Alberts): (p) [phone]. (f) [phone]. Established patients call:  [phone].  2)  [facility]/Maryana Hull: (p) [phone]  . (f) [phone].    3)  [facility] (Marianna Rosario or Kenia Joseph): (p) [phone].  Or [phone].   4)  [facility]. (p) [phone].     --this can also help mild urinary leakage    2)  Vaginal atrophy (dryness):  Use 0.5 gram of estrogen cream in vagina nightly x 2 weeks, then twice a week thereafter.   --this may also reduce UTIs and help bladder urgency/frequency    3)  RTC 3 months.     Approximately 50 min were spent in consult, 90 % in discussion.     Thank you for requesting consultation of your patient.  I look forward to participating in their care.    Perfecto Palencia  Female Pelvic Medicine and Reconstructive Surgery  [facility]  [place], [place]

## 2020-11-06 LAB — BACTERIA UR CULT: NO GROWTH

## 2020-11-08 PROBLEM — R30.0 DYSURIA: Status: ACTIVE | Noted: 2020-11-08

## 2020-11-08 PROBLEM — K59.09 CHRONIC CONSTIPATION: Status: ACTIVE | Noted: 2020-11-08

## 2020-11-08 PROBLEM — N95.2 VAGINAL ATROPHY: Status: ACTIVE | Noted: 2020-11-08

## 2020-11-08 PROBLEM — Z87.440 HISTORY OF UTI: Status: ACTIVE | Noted: 2020-11-08

## 2020-11-08 PROBLEM — M79.18 MYALGIA OF PELVIC FLOOR: Status: ACTIVE | Noted: 2020-11-08

## 2020-11-09 ENCOUNTER — TELEPHONE (OUTPATIENT)
Dept: UROGYNECOLOGY | Facility: CLINIC | Age: 70
End: 2020-11-09

## 2020-11-09 NOTE — TELEPHONE ENCOUNTER
----- Message from Perfecto Palencia MD sent at 11/6/2020  2:19 PM CST -----  Please let the patient know urine C&S was negative for infection.  Thanks!

## 2020-11-10 ENCOUNTER — PATIENT MESSAGE (OUTPATIENT)
Dept: INTERNAL MEDICINE | Facility: CLINIC | Age: 70
End: 2020-11-10

## 2020-11-13 ENCOUNTER — CLINICAL SUPPORT (OUTPATIENT)
Dept: REHABILITATION | Facility: HOSPITAL | Age: 70
End: 2020-11-13
Attending: OBSTETRICS & GYNECOLOGY
Payer: MEDICARE

## 2020-11-13 DIAGNOSIS — M79.18 MYALGIA OF PELVIC FLOOR: ICD-10-CM

## 2020-11-13 PROCEDURE — 97110 THERAPEUTIC EXERCISES: CPT | Mod: HCNC,PO

## 2020-11-13 PROCEDURE — 97162 PT EVAL MOD COMPLEX 30 MIN: CPT | Mod: HCNC,PO

## 2020-11-13 NOTE — PATIENT INSTRUCTIONS
"Home Exercise Program: 2020    DIAPHRAGMATIC BREATHING     The diaphragm is a dome shaped muscle that forms the floor of the rib cage. It is a respiratory muscle, but also forms the top of your "core" cannister that allows you to manage pressure within the abdomen and pelvis.   Diaphragmatic or belly breathing is an important technique to learn because it helps settle down or relax the autonomic nervous system, and it helps you to lengthen or "drop" the pelvic floor muscles.   This is accomplished by slow rhythmic breathing concentrated in the diaphragm muscle rather than the chest.    How to do proper relaxation breathing:     Start by lying on your back or reclining in a chair in a relaxed position. Place one hand on your chest and the other on your abdomen OR place both hands on your abdomen.   Relax your jaw by placing your tongue on the roof of your mouth and keeping your teeth slightly apart.    Take a deep breath in, letting the abdomen expand and rise while you keep your upper chest, neck and shoulders relaxed.    As you breathe out, allow your abdomen and chest to fall. Exhale completely.   It doesn't matter if you breathe in/out through your nose and/or mouth. Do whichever feels comfortable.   Remember to breathe slowly.  Do not force your breathing. Do not hold your breath.   Repeat for 5 minutes every day, IN ADDITION to doing on the toilet when you are trying to empty.  (see below).          Double Voiding: more than one urine stream per sitting.    When your urine stream stops, perform the followin. Body Scan to be sure that your legs, buttocks, and belly are relaxed.    2. Diaphragmatic breathing (belly breathing) to re-drop the pelvic floor.  3. Bladder tapping (as shown in clinic)  4. Stand up and sit back down.      If no more urine comes in 2-3 minutes, you're done!  "

## 2020-11-13 NOTE — PROGRESS NOTES
[facility]  Pelvic Health Physical Therapy Initial Evaluation    Date: 2020   Name: Ana Lilia Glass  [facility] Number: [id number]  Therapy Diagnosis:   Encounter Diagnosis   Name Primary?    Myalgia of pelvic floor      Physician: Perfecto Palencia MD    Physician Orders: PT Eval and Treat    Medical Diagnosis from Referral: myalgia of the pelvic floor  Evaluation Date: 2020  Authorization Period Expiration: 2020  Plan of Care Expiration: 2021  Visit # / Visits authorized:     Time In: 11:55  Time Out: 12:55  Total Appointment Time (timed & untimed codes): 60 minutes    Precautions: universal    Subjective     Date of onset: reports that she had a UTI about a month ago that started these symptoms.      History of current condition - Ana Lilia reports: that she has vaginal atrophy, and has had urgency and frequency.  Has had UTI's in the past.  Nocturia x 2; she denies urinary leakage.  She also reports poor emptying and dribbling.  She has a long history of constipation, was taking Linzess and is now taking Mag Citrate every day to have a BM (recommended by an MD family member).    OB/GYN History: ; episiotomy  Sexually active? No  Pain with vaginal exams, intercourse or tampon use? No    Bladder/Bowel History: trouble initiating urine stream, slow stream, trouble emptying bladder completely, recurrent bladder infections and constipation/straining for movement   Frequency of urination:   Daytime: every 2 hours           Nighttime: 2   Difficulty initiating urine stream: Yes- feels like she contracts her pelvic muscles prior to voiding.   Urine stream: weak   Complete emptying: No   Bladder leakage: No   Urinary Urgency: No, Able to delay the urge for at least 30 minute(s).   Frequency of bowel movements: once a day   Difficulty initiating BM: Yes   Quality/Shape of BM: Ralls Stool Chart 4 (on Mag Citrate)   Does Patient Feel Empty after BM? Yes   Fiber  Supplements or Laxative Use? Yes   Colon leakage: No    Pain:  none     Medical History: Ana Lilia  has a past medical history of Colon polyps, Hiatal hernia, Internal hemorrhoids, and Osteoporosis.     Surgical History: Ana Lilia Glass  has a past surgical history that includes Total thyroidectomy (1997); Colonoscopy (2011); Hemorrhoid surgery (12/2016); Hysterectomy (06/2016); Appendectomy (2004); and TONSILLECTOMY, ADENOIDECTOMY.    Medications: Ana Lilia has a current medication list which includes the following prescription(s): biotin, butalbital-acetaminophen-caffeine -40 mg, calcium carbonate/vitamin d3, estradiol, eszopiclone, fexofenadine-pseudoephedrine  mg, fluticasone propionate, levothyroxine, methen-m.blue-s.phos-phsal-hyo, and nitrofurantoin.    Allergies:   Review of patient's allergies indicates:   Allergen Reactions    Bactrim [sulfamethoxazole-trimethoprim] Other (See Comments)     Unknown    Codeine Itching and Rash    Sulfa (sulfonamide antibiotics) Other (See Comments)     Pt was 5yrs old        Prior Therapy/Previous treatment included: none  Social History:  lives alone   Current exercise: walks 2 dogs twice per day for 25 minutes each; is using a vibration plate for her hip bursitis  Occupation: Pt is a retired .  Prior Level of Function: no urinary urgency/frequency  Current Level of Function: urgency/frequency during ADL's.     Types of fluid intake: water and sparkling water; 1 cup coffee  Diet: eats in a 4 hour fasting window   Habitus: well developed, well nourished  Abuse/Neglect: No     Pts goals: to be able to control her urinary urge, and to have decreased urinary and bladder pain.      OBJECTIVE     See EMR under MEDIA for written consent provided 11/13/2020  Chaperone: declined    ORTHO SCREEN  Posture in sitting: WNL  Posture in standing: WNL  Pelvic alignment: no sign of deviations noted in supine     ABDOMINAL WALL ASSESSMENT  Abdominal strength:  Rectus abdominus: 3/5     Transverse abdominus: good, palpable contraction noted  Scarring: none  Diastasis: absent       VAGINAL PELVIC FLOOR EXAM    EXTERNAL ASSESSMENT  Introitus: stenotic  Skin condition: pale,dry  Scarring: episiotomy scar noted   Sensation: WNL   Pain: none    Voluntary contraction: lift  Voluntary relaxation: drop  Involuntary contraction: contraction  Bearing down: bulge  Perineal descent: absent      INTERNAL ASSESSMENT  Pain: tender areas noted as follows: bladder, L levator ani, B periurethral muscles   Sensation: able to localized pressure appropriately   Vaginal vault: stenotic   Muscle Bulk: atrophy   Muscle Power: 2+/5  Muscle Endurance: 5 sec      Quality of contraction: decreased hold   Specificity: WNL   Coordination: tends to hold breath during PFM contration   Prolapse check: none    TREATMENT     Treatment Time In: 12:45  Treatment Time Out: 12:55  Total Treatment time (time-based codes) separate from Evaluation: 10 minutes    Therapeutic Exercise to develop downtraining for 10 minutes including:  DB     Patient Education provided:   general anatomy/physiology of urinary/ bowel  system, benefits of treatment, risks of treatment and alternative methods of treatment were discussed with the pt. Additionally, posture/body mechanices and double voiding techniques were reviewed.     Home Exercises provided:  Written Home Exercises provided: yes.  Exercises were reviewed and Ana Lilia was able to demonstrate them prior to the end of the session.    Ana Lilia demonstrated good  understanding of the education provided.     See EMR under Patient Instructions for exercises provided 11/13/2020.    Assessment     Ana Lilia is a 70 y.o. female referred to outpatient Physical Therapy with a medical diagnosis of myalgia of the pelvic floor. Pt presents with poor knowledge of body mechanics and posture, pelvic floor tenderness and increased tension of the pelvic muscles.       Pt prognosis is  Good.   Pt will benefit from skilled outpatient Physical Therapy to address the deficits stated above and in the chart below, provide pt/family education, and to maximize pt's level of independence.     Plan of care discussed with patient: Yes  Pt's spiritual, cultural and educational needs considered and patient is agreeable to the plan of care and goals as stated below:     Anticipated Barriers for therapy: none    Medical Necessity is demonstrated by the following:    History  Co-morbidities and personal factors that may impact the plan of care Co-morbidities   prior abdominal surgery and prior pelvic surgery    Personal Factors  no deficits     moderate   Examination  Body structures and functions, activity limitations and participation restrictions that may impact the plan of care Body Regions/Systems/Functions:  poor knowledge of body mechanics and posture, pelvic floor tenderness, increased tension of the pelvic muscles and dysfunctional voiding     Activity Limitations:  urgency , Pain with ADLs and bathroom mapping    Participation Restrictions:  pain with voiding    Activity limitations:   Learning and applying knowledge  no deficits    General Tasks and Commands  no deficits    Communication  no deficits    Mobility  no deficits    Self care  no deficits    Domestic Life  no deficits    Interactions/Relationships  no deficits    Life Areas  no deficits    Community and Social Life  no deficits       moderate   Clinical Presentation unstable clinical presentation with unpredictable characteristics high   Decision Making/ Complexity Score: moderate       Goals:  Long Term Goals: 12 weeks   Pt will report improved ability to manage bladder spasms appropriately 100% of the time for an improvement in urinary frequency/urgency.   Pt will urinate without crede/Valsalva to prevent adverse effects to adjacent structures.  Pt will be independent with double voiding techniques 100% of the time to ensure full bladder  emptying and decrease pt's risk of infection.   Pt will report < or = 2/10 pain with urination/defecation 80% of the time.   Pt/family will be independent with HEP for continued self-management of symptoms.    Plan     Plan of care Certification: 11/13/2020 to 2/13/2020.    Outpatient Physical Therapy 1 times per 2 week(s)  for 12 weeks to include the following interventions: therapeutic exercises, neuromuscular re-education, manual therapy, patient/family education and self care/home management    Kimberlyn Alberts, PT, BCB-PMD

## 2020-11-20 ENCOUNTER — OFFICE VISIT (OUTPATIENT)
Dept: URGENT CARE | Facility: CLINIC | Age: 70
End: 2020-11-20
Payer: MEDICARE

## 2020-11-20 VITALS
RESPIRATION RATE: 18 BRPM | SYSTOLIC BLOOD PRESSURE: 117 MMHG | HEIGHT: 66 IN | WEIGHT: 134 LBS | HEART RATE: 61 BPM | DIASTOLIC BLOOD PRESSURE: 80 MMHG | OXYGEN SATURATION: 97 % | TEMPERATURE: 99 F | BODY MASS INDEX: 21.53 KG/M2

## 2020-11-20 DIAGNOSIS — Z11.59 SCREENING FOR VIRAL DISEASE: ICD-10-CM

## 2020-11-20 DIAGNOSIS — Z71.89 EDUCATED ABOUT COVID-19 VIRUS INFECTION: ICD-10-CM

## 2020-11-20 DIAGNOSIS — J02.9 SORE THROAT: Primary | ICD-10-CM

## 2020-11-20 DIAGNOSIS — J30.2 SEASONAL ALLERGIES: ICD-10-CM

## 2020-11-20 LAB
CTP QC/QA: YES
SARS-COV-2 RDRP RESP QL NAA+PROBE: NEGATIVE

## 2020-11-20 PROCEDURE — 3008F BODY MASS INDEX DOCD: CPT | Mod: CPTII,S$GLB,, | Performed by: PHYSICIAN ASSISTANT

## 2020-11-20 PROCEDURE — 99214 OFFICE O/P EST MOD 30 MIN: CPT | Mod: S$GLB,,, | Performed by: PHYSICIAN ASSISTANT

## 2020-11-20 PROCEDURE — U0002 COVID-19 LAB TEST NON-CDC: HCPCS | Mod: QW,S$GLB,, | Performed by: PHYSICIAN ASSISTANT

## 2020-11-20 PROCEDURE — 3008F PR BODY MASS INDEX (BMI) DOCUMENTED: ICD-10-PCS | Mod: CPTII,S$GLB,, | Performed by: PHYSICIAN ASSISTANT

## 2020-11-20 PROCEDURE — U0002: ICD-10-PCS | Mod: QW,S$GLB,, | Performed by: PHYSICIAN ASSISTANT

## 2020-11-20 PROCEDURE — 99214 PR OFFICE/OUTPT VISIT, EST, LEVL IV, 30-39 MIN: ICD-10-PCS | Mod: S$GLB,,, | Performed by: PHYSICIAN ASSISTANT

## 2020-11-20 NOTE — PROGRESS NOTES
"Subjective:       Patient ID: Ana Lilia Glass is a 70 y.o. female.    Vitals:  height is 5' 6" (1.676 m) and weight is 60.8 kg (134 lb). Her temperature is 98.5 °F (36.9 °C). Her blood pressure is 117/80 and her pulse is 61. Her respiration is 18 and oxygen saturation is 97%.     Chief Complaint: Nasal Congestion        70-year-old female with a history of IBS, GERD, former smoker, colon polyps, hemorrhoids, osteoporosis, depression and status post appendectomy, s/p thyroidectomy, s/p tonsillectomy/adenoidectomy who presents to urgent care clinic for evaluation.  She is complaining of mild symptoms that started yesterday.  She has mild nasal congestion and sore throat.  She thinks it is cold but would like to rule out COVID-19.  Not taking anything for symptoms since they are so mild.  No other associated symptoms.  No direct COVID-19 exposure.      Constitution: Negative for activity change, appetite change, chills, sweating, fatigue, fever and generalized weakness.   HENT: Positive for congestion and sore throat. Negative for ear pain, hearing loss, facial swelling, postnasal drip, sinus pain, sinus pressure, trouble swallowing and voice change.    Neck: Negative for neck pain, neck stiffness and painful lymph nodes.   Cardiovascular: Negative for chest pain, leg swelling, palpitations, sob on exertion and passing out.   Eyes: Negative for eye discharge, eye pain, eye redness, photophobia, vision loss, double vision and blurred vision.   Respiratory: Negative for chest tightness, cough, sputum production, bloody sputum, COPD, shortness of breath, stridor, wheezing and asthma.    Gastrointestinal: Negative for abdominal pain, nausea, vomiting, constipation, diarrhea, bright red blood in stool, rectal bleeding, heartburn and bowel incontinence.   Genitourinary: Negative for dysuria, frequency, urgency, urine decreased, flank pain, bladder incontinence and hematuria.   Musculoskeletal: Negative for trauma, joint " pain, joint swelling, abnormal ROM of joint, muscle cramps and muscle ache.   Skin: Negative for color change, pale, rash and wound.   Allergic/Immunologic: Negative for seasonal allergies, asthma and immunocompromised state.   Neurological: Negative for dizziness, history of vertigo, light-headedness, passing out, facial drooping, speech difficulty, coordination disturbances, loss of balance, headaches, disorientation, altered mental status, loss of consciousness, numbness, tingling and seizures.   Hematologic/Lymphatic: Negative for swollen lymph nodes, easy bruising/bleeding and trouble clotting. Does not bruise/bleed easily.   Psychiatric/Behavioral: Negative for altered mental status and disorientation.       Objective:      Physical Exam   Constitutional: She appears well-developed. She is cooperative.  Non-toxic appearance. She does not appear ill. No distress.   HENT:   Head: Normocephalic and atraumatic.   Ears:   Right Ear: Hearing, external ear and ear canal normal.   Left Ear: Hearing, external ear and ear canal normal.   Nose: Nose normal.   Mouth/Throat: Mucous membranes are moist. No oropharyngeal exudate or posterior oropharyngeal erythema. Oropharynx is clear.   Eyes: Pupils are equal, round, and reactive to light. Conjunctivae and EOM are normal. Right eye exhibits no discharge. Left eye exhibits no discharge. Right eye exhibits normal extraocular motion. Left eye exhibits normal extraocular motion. extraocular movement intactvision grossly intactgaze aligned appropriately  Neck: Normal range of motion and phonation normal. Neck supple. No neck rigidity.   Cardiovascular: Normal rate and regular rhythm.   Pulmonary/Chest: Effort normal. No accessory muscle usage. No respiratory distress. She has no wheezes.   Abdominal: Normal appearance. She exhibits no distension. There is no abdominal tenderness.   Musculoskeletal:      Right lower leg: No edema.      Left lower leg: No edema.      Comments:  Moves all extremities with normal tone, strength, and ROM.    Gait normal.   Lymphadenopathy:     She has no cervical adenopathy.   Neurological: She is alert. She has normal motor skills. She displays no weakness, facial symmetry and normal reflexes. No cranial nerve deficit or sensory deficit. She exhibits normal muscle tone. Gait and coordination normal. Coordination normal.   Skin: Skin is warm, dry, intact, not diaphoretic and no rash. Capillary refill takes less than 2 seconds. Psychiatric: Her speech is normal and behavior is normal. Mood, affect and thought content normal.   Nursing note reviewed.    Results for orders placed or performed in visit on 11/20/20   POCT COVID-19 Rapid Screening   Result Value Ref Range    POC Rapid COVID Negative Negative     Acceptable Yes              Assessment:       1. Sore throat    2. Seasonal allergies    3. Screening for viral disease    4. Educated about COVID-19 virus infection        Nontoxic appearing. Vitals are stable. Patient presents for COVID nasal swab testing. Patient is concerned for possible exposure. Rapid covid neg.  Patient has symptoms at this time which is consistent with viral syndrome.  All diagnostic testing personally reviewed and interpreted.       Patient was recommended OTC treatments for their symptoms. PPatient was counseled, explained with the test results meaning, expected COVID course, and answered all of questions. They can also receive results via my chart.  Printed and verbal COVID guidelines were given. Patient understands that they received an Urgent Care treatment only and that they may be released before all your medical problems are known or treated. Strict ED versus clinic precautions given.  Patient verbalized understanding and agreed with plan of care.    Note dictated with voice recognition software, please excuse any grammatical errors.    Plan:         Sore throat  -     POCT COVID-19 Rapid Screening    Seasonal  allergies    Screening for viral disease    Educated about COVID-19 virus infection           Patient Instructions     PLEASE READ YOUR DISCHARGE INSTRUCTIONS ENTIRELY AS IT CONTAINS IMPORTANT INFORMATION.    Patient had covid testing done today.      If Negative: symptom free without fever reducing meds in 24 hours - can go back to work in 24 hours with surgical mask for 14 days.    Discussed corona virus precautions and reviewed CDC FAC; printed a copy for patient.  I discussed to continue to monitor their symptoms. Discussed that if their symptoms persist or worsen to seek re-evaluation. Clinic vs. ER precautions were given.  Patient verbalized understanding and agreed with the above plan of care.    - Rest.  Drink plenty of fluids.    - Tylenol as directed as needed for fever/pain.  For Tylenol, do not exceed 4000 mg/ day. If no contraindication.      -Below are suggestions for symptomatic relief:              -Salt water gargles to soothe throat pain.              -Chloroseptic spray also helps to numb throat pain.              -Nasal saline spray reduces inflammation and dryness.              -Warm face compresses to help with facial sinus pain/pressure.              -Vicks vapor rub at night.              -Flonase OTC or Nasacort OTC for nasal congestion.              -Simple foods like chicken noodle soup.              -Mucinex for cough during the day time. Delsym helps with coughing at night. Mucinex-D if you have chest congestion or sputum (caution if history of high blood pressure or palpitations).              -Zyrtec/Claritin during the day & Benadryl at night may help with allergies.  -If you DO NOT have Hypertension or any history of palpitations, it is ok to take over the counter Sudafed or Mucinex D or Allegra-D or Claritin-D or Zyrtec-D.  -If you do take one of the above, it is ok to combine that with plain over the counter Mucinex or Allegra or Claritin or Zyrtec. If, for example, you are taking  Zyrtec -D, you can combine that with Mucinex, but not Mucinex-D.  If you are taking Mucinex-D, you can combine that with plain Allegra or Claritin or Zyrtec.   -If you DO have Hypertension or palpitations, it is safe to take Coricidin HBP for relief of sinus symptoms.      For your GI symptoms:  -Use gatorade/pedialyte or rehydration packets to help stay hydrated. Vitamin water and plain water do not contain rehydrating electrolytes.  -Increase clear liquids (water, gatorade, pedialyte, broths, jello, etc) Hold off on solids for 12-18 hours. Then advance to BRAT diet (banana, rice, applesauce, tea, toast/crackers), then advance further as tolerated. Avoid spicy or fatty foods.   -Use Peptobismol or Immodium to help alleviate your diarrhea symptoms.   -Avoid imodium unless you have more than 6 loose stools in 24 hours.   -Wash hands frequently while sick. Avoid ibuprofen or other NSAIDS until you are well.   -Please go to the ER if you experience worsening pain, blood in your vomit or stool, high fever, dizziness, fainting, swelling of your abdomen, inability to pass gas or stool.         -You must understand that you've received an Urgent Care treatment only and that you may be released before all your medical problems are known or treated. You, the patient, will arrange for follow up care as instructed. Please arrange follow up with your primary medical clinic within 2-5 days if your signs and symptoms have not resolved or worsen.   - Follow up with your PCP or specialty clinic as directed.  You can call [phone] or [phone] to schedule an appointment with the appropriate provider.    - If your condition worsens or fails to improve we recommend that you receive another evaluation at the emergency room immediately or contact your primary medical clinic to discuss your concerns.                Instructions for Patients Awaiting COVID-19 Test Results    You will either be called with your test result or it  will be released to the patient portal.  If you have any questions about your test, please visit [email or web address] or call our COVID-19 information line at [phone].    Prevention steps for patients with confirmed or suspected COVID-19       Stay home and stay away from family members and friends. The CDC says, you can leave home after these three things have happened: 1) You have had no fever for at least 24 hours (that is one full day of no fever without the use of medicine that reduces fevers) 2) AND other symptoms have improved (for example, when your cough or shortness of breath have improved) 3) AND at least 10 days have passed since your symptoms first appeared.   Separate yourself from other people and animals in your home.   Call ahead before visiting your doctor.   Wear a facemask.   Cover your coughs and sneezes.   Wash your hands often with soap and water; hand  can be used, too.   Avoid sharing personal household items.   Wipe down surfaces used daily.   Monitor your symptoms. Seek prompt medical attention if your illness is worsening (e.g., difficulty breathing).    Before seeking care, call your healthcare provider.   If you have a medical emergency and need to call 911, notify the dispatch personnel that you have, or are being evaluated for COVID-19. If possible, put on a facemask before emergency medical services arrive.        Recommended precautions for household members, intimate partners, and caregivers in a home setting of a patient with symptomatic laboratory-confirmed COVID-19 or a patient under investigation.  Household members, intimate partners, and caregivers in the home setting awaiting tests results have close contact with a person with symptomatic, laboratory-confirmed COVID-19 or a person under investigation. Close contacts should monitor their health; they should call their provider right away if they develop symptoms suggestive of COVID-19 (e.g.,  fever, cough, shortness of breath).    Close contacts should also follow these recommendations:   Make sure that you understand and can help the patient follow their provider's instructions for medication(s) and care. You should help the patient with basic needs in the home and provide support for getting groceries, prescriptions, and other personal needs.   Monitor the patient's symptoms. If the patient is getting sicker, call his or her healthcare provider and tell them that the patient has laboratory-confirmed COVID-19. If the patient has a medical emergency and you need to call 911, notify the dispatch personnel that the patient has, or is being evaluated for COVID-19.   Household members should stay in another room or be  from the patient. Household members should use a separate bedroom and bathroom, if available.   Prohibit visitors.   Household members should care for any pets in the home.   Make sure that shared spaces in the home have good air flow, such as by an air conditioner or an opened window, weather permitting.   Perform hand hygiene frequently. Wash your hands often with soap and water for at least 20 seconds or use an alcohol-based hand  (that contains > 60% alcohol) covering all surfaces of your hands and rubbing them together until they feel dry. Soap and water should be used preferentially.   Avoid touching your eyes, nose, and mouth.   The patient should wear a facemask. If the patient is not able to wear a facemask (for example, because it causes trouble breathing), caregivers should wear a mask when they are in the same room as the patient.   Wear a disposable facemask and gloves when you touch or have contact with the patient's blood, stool, or body fluids, such as saliva, sputum, nasal mucus, vomit, urine.  o Throw out disposable facemasks and gloves after using them. Do not reuse.  o When removing personal protective equipment, first remove and dispose of  gloves. Then, immediately clean your hands with soap and water or alcohol-based hand . Next, remove and dispose of facemask, and immediately clean your hands again with soap and water or alcohol-based hand .   You should not share dishes, drinking glasses, cups, eating utensils, towels, bedding, or other items with the patient. After the patient uses these items, you should wash them thoroughly (see below Wash laundry thoroughly).   Clean all high-touch surfaces, such as counters, tabletops, doorknobs, bathroom fixtures, toilets, phones, keyboards, tablets, and bedside tables, every day. Also, clean any surfaces that may have blood, stool, or body fluids on them.   Use a household cleaning spray or wipe, according to the label instructions. Labels contain instructions for safe and effective use of the cleaning product including precautions you should take when applying the product, such as wearing gloves and making sure you have good ventilation during use of the product.   Wash laundry thoroughly.  o Immediately remove and wash clothes or bedding that have blood, stool, or body fluids on them.  o Wear disposable gloves while handling soiled items and keep soiled items away from your body. Clean your hands (with soap and water or an alcohol-based hand ) immediately after removing your gloves.  o Read and follow directions on labels of laundry or clothing items and detergent. In general, using a normal laundry detergent according to washing machine instructions and dry thoroughly using the warmest temperatures recommended on the clothing label.   Place all used disposable gloves, facemasks, and other contaminated items in a lined container before disposing of them with other household waste. Clean your hands (with soap and water or an alcohol-based hand ) immediately after handling these items. Soap and water should be used preferentially if hands are visibly  dirty.   Discuss any additional questions with your state or local health department or healthcare provider. Check available hours when contacting your local health department.    For more information see CDC link below.      https://www.cdc.gov/coronavirus/2019-ncov/hcp/guidance-prevent-spread.html#precautions        Sources:  [facility], [place] [facility]          Instructions for Home Care of Patients and Caretakers with Coronavirus Disease 2019     Limit visitors to the home.  Older persons and those that have chronic medical conditions such as diabetes, lung and heart disease are at increased risk for illness.    If possible, patients should use a separate bedroom while recovering. Caregivers and household members should avoid prolonged contact with the patient which means to stay 6 feet away and avoid contact with cough droplets.  When close contact is necessary, wash your hands before and immediately after contact.    Perform hand hygiene frequently. Wash your hands often with soap and water for at least 20 seconds or use an alcohol-based hand , covering all surfaces of your hands and rubbing them together until they feel dry.    Avoid touching your eyes, nose, and mouth with unwashed hands.   Avoid sharing household items with the patient. You should not share dishes, drinking glasses, cups, eating utensils, towels, bedding, or other items. After the patient uses these items, you should wash them thoroughly.   Wash laundry thoroughly.   o Immediately remove and wash clothes or bedding that have blood, stool, or body fluids on them.   Clean all high-touch surfaces, such as counters, tabletops, doorknobs, bathroom fixtures, toilets, phones, keyboards, tablets, and bedside tables, every day.   o Use a household cleaning spray or wipe, according to the label instructions. Labels contain instructions for safe and effective use of the cleaning product including precautions  you should take when applying the product, such as wearing gloves and making sure you have good ventilation during use of the product.    For more information see CDC link below.      https://www.cdc.gov/coronavirus/2019-ncov/hcp/guidance-prevent-spread.html#precautions               If your symptoms worsen or if you have any other concerns, please contact [facility] at [phone].

## 2020-11-20 NOTE — PATIENT INSTRUCTIONS
PLEASE READ YOUR DISCHARGE INSTRUCTIONS ENTIRELY AS IT CONTAINS IMPORTANT INFORMATION.    Patient had covid testing done today.      If Negative: symptom free without fever reducing meds in 24 hours - can go back to work in 24 hours with surgical mask for 14 days.    Discussed corona virus precautions and reviewed CDC FAC; printed a copy for patient.  I discussed to continue to monitor their symptoms. Discussed that if their symptoms persist or worsen to seek re-evaluation. Clinic vs. ER precautions were given.  Patient verbalized understanding and agreed with the above plan of care.    - Rest.  Drink plenty of fluids.    - Tylenol as directed as needed for fever/pain.  For Tylenol, do not exceed 4000 mg/ day. If no contraindication.      -Below are suggestions for symptomatic relief:              -Salt water gargles to soothe throat pain.              -Chloroseptic spray also helps to numb throat pain.              -Nasal saline spray reduces inflammation and dryness.              -Warm face compresses to help with facial sinus pain/pressure.              -Vicks vapor rub at night.              -Flonase OTC or Nasacort OTC for nasal congestion.              -Simple foods like chicken noodle soup.              -Mucinex for cough during the day time. Delsym helps with coughing at night. Mucinex-D if you have chest congestion or sputum (caution if history of high blood pressure or palpitations).              -Zyrtec/Claritin during the day & Benadryl at night may help with allergies.  -If you DO NOT have Hypertension or any history of palpitations, it is ok to take over the counter Sudafed or Mucinex D or Allegra-D or Claritin-D or Zyrtec-D.  -If you do take one of the above, it is ok to combine that with plain over the counter Mucinex or Allegra or Claritin or Zyrtec. If, for example, you are taking Zyrtec -D, you can combine that with Mucinex, but not Mucinex-D.  If you are taking Mucinex-D, you can combine that  with plain Allegra or Claritin or Zyrtec.   -If you DO have Hypertension or palpitations, it is safe to take Coricidin HBP for relief of sinus symptoms.      For your GI symptoms:  -Use gatorade/pedialyte or rehydration packets to help stay hydrated. Vitamin water and plain water do not contain rehydrating electrolytes.  -Increase clear liquids (water, gatorade, pedialyte, broths, jello, etc) Hold off on solids for 12-18 hours. Then advance to BRAT diet (banana, rice, applesauce, tea, toast/crackers), then advance further as tolerated. Avoid spicy or fatty foods.   -Use Peptobismol or Immodium to help alleviate your diarrhea symptoms.   -Avoid imodium unless you have more than 6 loose stools in 24 hours.   -Wash hands frequently while sick. Avoid ibuprofen or other NSAIDS until you are well.   -Please go to the ER if you experience worsening pain, blood in your vomit or stool, high fever, dizziness, fainting, swelling of your abdomen, inability to pass gas or stool.         -You must understand that you've received an Urgent Care treatment only and that you may be released before all your medical problems are known or treated. You, the patient, will arrange for follow up care as instructed. Please arrange follow up with your primary medical clinic within 2-5 days if your signs and symptoms have not resolved or worsen.   - Follow up with your PCP or specialty clinic as directed.  You can call [phone] or [phone] to schedule an appointment with the appropriate provider.    - If your condition worsens or fails to improve we recommend that you receive another evaluation at the emergency room immediately or contact your primary medical clinic to discuss your concerns.                Instructions for Patients Awaiting COVID-19 Test Results    You will either be called with your test result or it will be released to the patient portal.  If you have any questions about your test, please visit  [email or web address] or call our COVID-19 information line at [phone].    Prevention steps for patients with confirmed or suspected COVID-19       Stay home and stay away from family members and friends. The CDC says, you can leave home after these three things have happened: 1) You have had no fever for at least 24 hours (that is one full day of no fever without the use of medicine that reduces fevers) 2) AND other symptoms have improved (for example, when your cough or shortness of breath have improved) 3) AND at least 10 days have passed since your symptoms first appeared.   Separate yourself from other people and animals in your home.   Call ahead before visiting your doctor.   Wear a facemask.   Cover your coughs and sneezes.   Wash your hands often with soap and water; hand  can be used, too.   Avoid sharing personal household items.   Wipe down surfaces used daily.   Monitor your symptoms. Seek prompt medical attention if your illness is worsening (e.g., difficulty breathing).    Before seeking care, call your healthcare provider.   If you have a medical emergency and need to call 911, notify the dispatch personnel that you have, or are being evaluated for COVID-19. If possible, put on a facemask before emergency medical services arrive.        Recommended precautions for household members, intimate partners, and caregivers in a home setting of a patient with symptomatic laboratory-confirmed COVID-19 or a patient under investigation.  Household members, intimate partners, and caregivers in the home setting awaiting tests results have close contact with a person with symptomatic, laboratory-confirmed COVID-19 or a person under investigation. Close contacts should monitor their health; they should call their provider right away if they develop symptoms suggestive of COVID-19 (e.g., fever, cough, shortness of breath).    Close contacts should also follow these  recommendations:   Make sure that you understand and can help the patient follow their provider's instructions for medication(s) and care. You should help the patient with basic needs in the home and provide support for getting groceries, prescriptions, and other personal needs.   Monitor the patient's symptoms. If the patient is getting sicker, call his or her healthcare provider and tell them that the patient has laboratory-confirmed COVID-19. If the patient has a medical emergency and you need to call 911, notify the dispatch personnel that the patient has, or is being evaluated for COVID-19.   Household members should stay in another room or be  from the patient. Household members should use a separate bedroom and bathroom, if available.   Prohibit visitors.   Household members should care for any pets in the home.   Make sure that shared spaces in the home have good air flow, such as by an air conditioner or an opened window, weather permitting.   Perform hand hygiene frequently. Wash your hands often with soap and water for at least 20 seconds or use an alcohol-based hand  (that contains > 60% alcohol) covering all surfaces of your hands and rubbing them together until they feel dry. Soap and water should be used preferentially.   Avoid touching your eyes, nose, and mouth.   The patient should wear a facemask. If the patient is not able to wear a facemask (for example, because it causes trouble breathing), caregivers should wear a mask when they are in the same room as the patient.   Wear a disposable facemask and gloves when you touch or have contact with the patient's blood, stool, or body fluids, such as saliva, sputum, nasal mucus, vomit, urine.  o Throw out disposable facemasks and gloves after using them. Do not reuse.  o When removing personal protective equipment, first remove and dispose of gloves. Then, immediately clean your hands with soap and water or alcohol-based hand  . Next, remove and dispose of facemask, and immediately clean your hands again with soap and water or alcohol-based hand .   You should not share dishes, drinking glasses, cups, eating utensils, towels, bedding, or other items with the patient. After the patient uses these items, you should wash them thoroughly (see below Wash laundry thoroughly).   Clean all high-touch surfaces, such as counters, tabletops, doorknobs, bathroom fixtures, toilets, phones, keyboards, tablets, and bedside tables, every day. Also, clean any surfaces that may have blood, stool, or body fluids on them.   Use a household cleaning spray or wipe, according to the label instructions. Labels contain instructions for safe and effective use of the cleaning product including precautions you should take when applying the product, such as wearing gloves and making sure you have good ventilation during use of the product.   Wash laundry thoroughly.  o Immediately remove and wash clothes or bedding that have blood, stool, or body fluids on them.  o Wear disposable gloves while handling soiled items and keep soiled items away from your body. Clean your hands (with soap and water or an alcohol-based hand ) immediately after removing your gloves.  o Read and follow directions on labels of laundry or clothing items and detergent. In general, using a normal laundry detergent according to washing machine instructions and dry thoroughly using the warmest temperatures recommended on the clothing label.   Place all used disposable gloves, facemasks, and other contaminated items in a lined container before disposing of them with other household waste. Clean your hands (with soap and water or an alcohol-based hand ) immediately after handling these items. Soap and water should be used preferentially if hands are visibly dirty.   Discuss any additional questions with your state or local health department or  healthcare provider. Check available hours when contacting your local health department.    For more information see CDC link below.      https://www.cdc.gov/coronavirus/2019-ncov/hcp/guidance-prevent-spread.html#precautions        Sources:  [facility], [place] [facility]          Instructions for Home Care of Patients and Caretakers with Coronavirus Disease 2019     Limit visitors to the home.  Older persons and those that have chronic medical conditions such as diabetes, lung and heart disease are at increased risk for illness.    If possible, patients should use a separate bedroom while recovering. Caregivers and household members should avoid prolonged contact with the patient which means to stay 6 feet away and avoid contact with cough droplets.  When close contact is necessary, wash your hands before and immediately after contact.    Perform hand hygiene frequently. Wash your hands often with soap and water for at least 20 seconds or use an alcohol-based hand , covering all surfaces of your hands and rubbing them together until they feel dry.    Avoid touching your eyes, nose, and mouth with unwashed hands.   Avoid sharing household items with the patient. You should not share dishes, drinking glasses, cups, eating utensils, towels, bedding, or other items. After the patient uses these items, you should wash them thoroughly.   Wash laundry thoroughly.   o Immediately remove and wash clothes or bedding that have blood, stool, or body fluids on them.   Clean all high-touch surfaces, such as counters, tabletops, doorknobs, bathroom fixtures, toilets, phones, keyboards, tablets, and bedside tables, every day.   o Use a household cleaning spray or wipe, according to the label instructions. Labels contain instructions for safe and effective use of the cleaning product including precautions you should take when applying the product, such as wearing gloves and making sure you have  good ventilation during use of the product.    For more information see CDC link below.      https://www.cdc.gov/coronavirus/2019-ncov/hcp/guidance-prevent-spread.html#precautions               If your symptoms worsen or if you have any other concerns, please contact [facility] at [phone].

## 2020-11-30 ENCOUNTER — TELEPHONE (OUTPATIENT)
Dept: INTERNAL MEDICINE | Facility: CLINIC | Age: 70
End: 2020-11-30

## 2020-11-30 ENCOUNTER — LAB VISIT (OUTPATIENT)
Dept: LAB | Facility: HOSPITAL | Age: 70
End: 2020-11-30
Attending: INTERNAL MEDICINE
Payer: MEDICARE

## 2020-11-30 DIAGNOSIS — E89.0 POSTOPERATIVE HYPOTHYROIDISM: Primary | ICD-10-CM

## 2020-11-30 DIAGNOSIS — E89.0 POSTOPERATIVE HYPOTHYROIDISM: ICD-10-CM

## 2020-11-30 LAB
T3 SERPL-MCNC: 78 NG/DL (ref 60–180)
T4 FREE SERPL-MCNC: 1.11 NG/DL (ref 0.71–1.51)
TSH SERPL DL<=0.005 MIU/L-ACNC: 0.13 UIU/ML (ref 0.4–4)

## 2020-11-30 PROCEDURE — 84480 ASSAY TRIIODOTHYRONINE (T3): CPT | Mod: HCNC

## 2020-11-30 PROCEDURE — 36415 COLL VENOUS BLD VENIPUNCTURE: CPT | Mod: HCNC

## 2020-11-30 PROCEDURE — 84439 ASSAY OF FREE THYROXINE: CPT | Mod: HCNC

## 2020-11-30 PROCEDURE — 84443 ASSAY THYROID STIM HORMONE: CPT | Mod: HCNC

## 2020-11-30 RX ORDER — LEVOTHYROXINE SODIUM 200 UG/1
200 TABLET ORAL
Qty: 90 TABLET | Refills: 3 | Status: SHIPPED | OUTPATIENT
Start: 2020-11-30 | End: 2021-12-06

## 2021-01-12 ENCOUNTER — PATIENT MESSAGE (OUTPATIENT)
Dept: INTERNAL MEDICINE | Facility: CLINIC | Age: 71
End: 2021-01-12

## 2021-02-04 ENCOUNTER — LAB VISIT (OUTPATIENT)
Dept: LAB | Facility: OTHER | Age: 71
End: 2021-02-04
Attending: INTERNAL MEDICINE
Payer: MEDICARE

## 2021-02-04 ENCOUNTER — OFFICE VISIT (OUTPATIENT)
Dept: UROGYNECOLOGY | Facility: CLINIC | Age: 71
End: 2021-02-04
Payer: MEDICARE

## 2021-02-04 VITALS
DIASTOLIC BLOOD PRESSURE: 60 MMHG | BODY MASS INDEX: 22.18 KG/M2 | SYSTOLIC BLOOD PRESSURE: 100 MMHG | WEIGHT: 138 LBS | HEIGHT: 66 IN

## 2021-02-04 DIAGNOSIS — E89.0 POSTOPERATIVE HYPOTHYROIDISM: ICD-10-CM

## 2021-02-04 DIAGNOSIS — Z87.440 HISTORY OF UTI: Primary | ICD-10-CM

## 2021-02-04 DIAGNOSIS — K59.09 CHRONIC CONSTIPATION: ICD-10-CM

## 2021-02-04 DIAGNOSIS — N95.2 VAGINAL ATROPHY: ICD-10-CM

## 2021-02-04 LAB
T4 FREE SERPL-MCNC: 0.93 NG/DL (ref 0.71–1.51)
TSH SERPL DL<=0.005 MIU/L-ACNC: 1.71 UIU/ML (ref 0.4–4)

## 2021-02-04 PROCEDURE — 3008F PR BODY MASS INDEX (BMI) DOCUMENTED: ICD-10-PCS | Mod: CPTII,S$GLB,, | Performed by: PHYSICIAN ASSISTANT

## 2021-02-04 PROCEDURE — 99211 PR OFFICE/OUTPT VISIT, EST, LEVL I: ICD-10-PCS | Mod: S$GLB,,, | Performed by: PHYSICIAN ASSISTANT

## 2021-02-04 PROCEDURE — 84439 ASSAY OF FREE THYROXINE: CPT

## 2021-02-04 PROCEDURE — 1126F AMNT PAIN NOTED NONE PRSNT: CPT | Mod: S$GLB,,, | Performed by: PHYSICIAN ASSISTANT

## 2021-02-04 PROCEDURE — 3288F PR FALLS RISK ASSESSMENT DOCUMENTED: ICD-10-PCS | Mod: CPTII,S$GLB,, | Performed by: PHYSICIAN ASSISTANT

## 2021-02-04 PROCEDURE — 99999 PR PBB SHADOW E&M-EST. PATIENT-LVL III: ICD-10-PCS | Mod: PBBFAC,,, | Performed by: PHYSICIAN ASSISTANT

## 2021-02-04 PROCEDURE — 1101F PT FALLS ASSESS-DOCD LE1/YR: CPT | Mod: CPTII,S$GLB,, | Performed by: PHYSICIAN ASSISTANT

## 2021-02-04 PROCEDURE — 3288F FALL RISK ASSESSMENT DOCD: CPT | Mod: CPTII,S$GLB,, | Performed by: PHYSICIAN ASSISTANT

## 2021-02-04 PROCEDURE — 99211 OFF/OP EST MAY X REQ PHY/QHP: CPT | Mod: S$GLB,,, | Performed by: PHYSICIAN ASSISTANT

## 2021-02-04 PROCEDURE — 99999 PR PBB SHADOW E&M-EST. PATIENT-LVL III: CPT | Mod: PBBFAC,,, | Performed by: PHYSICIAN ASSISTANT

## 2021-02-04 PROCEDURE — 1126F PR PAIN SEVERITY QUANTIFIED, NO PAIN PRESENT: ICD-10-PCS | Mod: S$GLB,,, | Performed by: PHYSICIAN ASSISTANT

## 2021-02-04 PROCEDURE — 84480 ASSAY TRIIODOTHYRONINE (T3): CPT

## 2021-02-04 PROCEDURE — 1101F PR PT FALLS ASSESS DOC 0-1 FALLS W/OUT INJ PAST YR: ICD-10-PCS | Mod: CPTII,S$GLB,, | Performed by: PHYSICIAN ASSISTANT

## 2021-02-04 PROCEDURE — 36415 COLL VENOUS BLD VENIPUNCTURE: CPT

## 2021-02-04 PROCEDURE — 84443 ASSAY THYROID STIM HORMONE: CPT

## 2021-02-04 PROCEDURE — 3008F BODY MASS INDEX DOCD: CPT | Mod: CPTII,S$GLB,, | Performed by: PHYSICIAN ASSISTANT

## 2021-02-05 ENCOUNTER — PATIENT MESSAGE (OUTPATIENT)
Dept: INTERNAL MEDICINE | Facility: CLINIC | Age: 71
End: 2021-02-05

## 2021-02-05 LAB — T3 SERPL-MCNC: 67 NG/DL (ref 60–180)

## 2021-04-13 ENCOUNTER — TELEPHONE (OUTPATIENT)
Dept: INTERNAL MEDICINE | Facility: CLINIC | Age: 71
End: 2021-04-13

## 2021-05-28 ENCOUNTER — DOCUMENTATION ONLY (OUTPATIENT)
Dept: REHABILITATION | Facility: HOSPITAL | Age: 71
End: 2021-05-28

## 2021-06-15 ENCOUNTER — PES CALL (OUTPATIENT)
Dept: ADMINISTRATIVE | Facility: CLINIC | Age: 71
End: 2021-06-15

## 2021-06-23 ENCOUNTER — LAB VISIT (OUTPATIENT)
Dept: LAB | Facility: HOSPITAL | Age: 71
End: 2021-06-23
Attending: INTERNAL MEDICINE
Payer: MEDICARE

## 2021-06-23 ENCOUNTER — PATIENT OUTREACH (OUTPATIENT)
Dept: ADMINISTRATIVE | Facility: HOSPITAL | Age: 71
End: 2021-06-23

## 2021-06-23 ENCOUNTER — OFFICE VISIT (OUTPATIENT)
Dept: PRIMARY CARE CLINIC | Facility: CLINIC | Age: 71
End: 2021-06-23
Payer: MEDICARE

## 2021-06-23 VITALS
TEMPERATURE: 98 F | DIASTOLIC BLOOD PRESSURE: 62 MMHG | HEART RATE: 55 BPM | SYSTOLIC BLOOD PRESSURE: 110 MMHG | BODY MASS INDEX: 22.08 KG/M2 | HEIGHT: 66 IN | RESPIRATION RATE: 19 BRPM | OXYGEN SATURATION: 99 % | WEIGHT: 137.38 LBS

## 2021-06-23 DIAGNOSIS — R00.2 PALPITATIONS: ICD-10-CM

## 2021-06-23 DIAGNOSIS — E89.0 POSTOPERATIVE HYPOTHYROIDISM: Primary | ICD-10-CM

## 2021-06-23 DIAGNOSIS — N95.2 VAGINAL ATROPHY: ICD-10-CM

## 2021-06-23 DIAGNOSIS — K63.5 POLYP OF COLON, UNSPECIFIED PART OF COLON, UNSPECIFIED TYPE: ICD-10-CM

## 2021-06-23 DIAGNOSIS — F32.A DEPRESSION, UNSPECIFIED DEPRESSION TYPE: ICD-10-CM

## 2021-06-23 DIAGNOSIS — E89.0 POSTOPERATIVE HYPOTHYROIDISM: ICD-10-CM

## 2021-06-23 DIAGNOSIS — Z01.00 ENCOUNTER FOR VISION SCREENING: ICD-10-CM

## 2021-06-23 LAB
ALBUMIN SERPL BCP-MCNC: 4 G/DL (ref 3.5–5.2)
ALP SERPL-CCNC: 69 U/L (ref 55–135)
ALT SERPL W/O P-5'-P-CCNC: 8 U/L (ref 10–44)
ANION GAP SERPL CALC-SCNC: 9 MMOL/L (ref 8–16)
AST SERPL-CCNC: 16 U/L (ref 10–40)
BILIRUB SERPL-MCNC: 0.4 MG/DL (ref 0.1–1)
BUN SERPL-MCNC: 14 MG/DL (ref 8–23)
CALCIUM SERPL-MCNC: 9.5 MG/DL (ref 8.7–10.5)
CHLORIDE SERPL-SCNC: 105 MMOL/L (ref 95–110)
CO2 SERPL-SCNC: 26 MMOL/L (ref 23–29)
CREAT SERPL-MCNC: 0.8 MG/DL (ref 0.5–1.4)
EST. GFR  (AFRICAN AMERICAN): >60 ML/MIN/1.73 M^2
EST. GFR  (NON AFRICAN AMERICAN): >60 ML/MIN/1.73 M^2
GLUCOSE SERPL-MCNC: 86 MG/DL (ref 70–110)
POTASSIUM SERPL-SCNC: 4.1 MMOL/L (ref 3.5–5.1)
PROT SERPL-MCNC: 6.7 G/DL (ref 6–8.4)
SODIUM SERPL-SCNC: 140 MMOL/L (ref 136–145)
TSH SERPL DL<=0.005 MIU/L-ACNC: 1.92 UIU/ML (ref 0.4–4)

## 2021-06-23 PROCEDURE — 3008F BODY MASS INDEX DOCD: CPT | Mod: CPTII,S$GLB,, | Performed by: INTERNAL MEDICINE

## 2021-06-23 PROCEDURE — 1126F PR PAIN SEVERITY QUANTIFIED, NO PAIN PRESENT: ICD-10-PCS | Mod: S$GLB,,, | Performed by: INTERNAL MEDICINE

## 2021-06-23 PROCEDURE — 1101F PR PT FALLS ASSESS DOC 0-1 FALLS W/OUT INJ PAST YR: ICD-10-PCS | Mod: CPTII,S$GLB,, | Performed by: INTERNAL MEDICINE

## 2021-06-23 PROCEDURE — 99214 OFFICE O/P EST MOD 30 MIN: CPT | Mod: S$GLB,,, | Performed by: INTERNAL MEDICINE

## 2021-06-23 PROCEDURE — 1101F PT FALLS ASSESS-DOCD LE1/YR: CPT | Mod: CPTII,S$GLB,, | Performed by: INTERNAL MEDICINE

## 2021-06-23 PROCEDURE — 1126F AMNT PAIN NOTED NONE PRSNT: CPT | Mod: S$GLB,,, | Performed by: INTERNAL MEDICINE

## 2021-06-23 PROCEDURE — 1159F PR MEDICATION LIST DOCUMENTED IN MEDICAL RECORD: ICD-10-PCS | Mod: S$GLB,,, | Performed by: INTERNAL MEDICINE

## 2021-06-23 PROCEDURE — 99999 PR PBB SHADOW E&M-EST. PATIENT-LVL V: ICD-10-PCS | Mod: PBBFAC,,, | Performed by: INTERNAL MEDICINE

## 2021-06-23 PROCEDURE — 99499 RISK ADDL DX/OHS AUDIT: ICD-10-PCS | Mod: S$GLB,,, | Performed by: INTERNAL MEDICINE

## 2021-06-23 PROCEDURE — 99214 PR OFFICE/OUTPT VISIT, EST, LEVL IV, 30-39 MIN: ICD-10-PCS | Mod: S$GLB,,, | Performed by: INTERNAL MEDICINE

## 2021-06-23 PROCEDURE — 3288F FALL RISK ASSESSMENT DOCD: CPT | Mod: CPTII,S$GLB,, | Performed by: INTERNAL MEDICINE

## 2021-06-23 PROCEDURE — 3008F PR BODY MASS INDEX (BMI) DOCUMENTED: ICD-10-PCS | Mod: CPTII,S$GLB,, | Performed by: INTERNAL MEDICINE

## 2021-06-23 PROCEDURE — 3288F PR FALLS RISK ASSESSMENT DOCUMENTED: ICD-10-PCS | Mod: CPTII,S$GLB,, | Performed by: INTERNAL MEDICINE

## 2021-06-23 PROCEDURE — 1159F MED LIST DOCD IN RCRD: CPT | Mod: S$GLB,,, | Performed by: INTERNAL MEDICINE

## 2021-06-23 PROCEDURE — 99499 UNLISTED E&M SERVICE: CPT | Mod: S$GLB,,, | Performed by: INTERNAL MEDICINE

## 2021-06-23 PROCEDURE — 80053 COMPREHEN METABOLIC PANEL: CPT | Performed by: INTERNAL MEDICINE

## 2021-06-23 PROCEDURE — 99999 PR PBB SHADOW E&M-EST. PATIENT-LVL V: CPT | Mod: PBBFAC,,, | Performed by: INTERNAL MEDICINE

## 2021-06-23 PROCEDURE — 84443 ASSAY THYROID STIM HORMONE: CPT | Performed by: INTERNAL MEDICINE

## 2021-06-23 PROCEDURE — 36415 COLL VENOUS BLD VENIPUNCTURE: CPT | Mod: PN | Performed by: INTERNAL MEDICINE

## 2021-06-23 RX ORDER — TRIAMCINOLONE ACETONIDE 1 MG/G
CREAM TOPICAL
COMMUNITY
Start: 2021-05-28

## 2021-06-23 RX ORDER — ESCITALOPRAM OXALATE 10 MG/1
TABLET ORAL
COMMUNITY
Start: 2021-06-10 | End: 2021-06-23

## 2021-06-23 RX ORDER — ESCITALOPRAM OXALATE 10 MG/1
TABLET ORAL
COMMUNITY
Start: 2021-04-14 | End: 2022-11-17

## 2021-06-24 ENCOUNTER — PATIENT OUTREACH (OUTPATIENT)
Dept: ADMINISTRATIVE | Facility: HOSPITAL | Age: 71
End: 2021-06-24

## 2021-07-06 ENCOUNTER — PATIENT MESSAGE (OUTPATIENT)
Dept: PRIMARY CARE CLINIC | Facility: CLINIC | Age: 71
End: 2021-07-06

## 2021-07-20 NOTE — TELEPHONE ENCOUNTER
Attempted to contact pt to inform her that her urine culture was negative for infection. Pt did not answer. Lm to call office.   Doxepin Counseling:  Patient advised that the medication is sedating and not to drive a car after taking this medication. Patient informed of potential adverse effects including but not limited to dry mouth, urinary retention, and blurry vision.  The patient verbalized understanding of the proper use and possible adverse effects of doxepin.  All of the patient's questions and concerns were addressed.

## 2021-07-23 ENCOUNTER — PATIENT OUTREACH (OUTPATIENT)
Dept: ADMINISTRATIVE | Facility: HOSPITAL | Age: 71
End: 2021-07-23

## 2021-09-29 ENCOUNTER — PATIENT OUTREACH (OUTPATIENT)
Dept: ADMINISTRATIVE | Facility: HOSPITAL | Age: 71
End: 2021-09-29

## 2021-10-08 ENCOUNTER — IMMUNIZATION (OUTPATIENT)
Dept: INTERNAL MEDICINE | Facility: CLINIC | Age: 71
End: 2021-10-08
Payer: MEDICARE

## 2021-10-08 DIAGNOSIS — Z23 NEED FOR VACCINATION: Primary | ICD-10-CM

## 2021-10-08 PROCEDURE — 0013A COVID-19, MRNA, LNP-S, PF, 100 MCG/0.5 ML DOSE VACCINE: CPT | Mod: HCNC,PBBFAC | Performed by: FAMILY MEDICINE

## 2021-10-08 PROCEDURE — 91301 COVID-19, MRNA, LNP-S, PF, 100 MCG/0.5 ML DOSE VACCINE: CPT | Mod: HCNC,PBBFAC | Performed by: FAMILY MEDICINE

## 2021-10-09 ENCOUNTER — PATIENT MESSAGE (OUTPATIENT)
Dept: PRIMARY CARE CLINIC | Facility: CLINIC | Age: 71
End: 2021-10-09

## 2021-10-12 ENCOUNTER — LAB VISIT (OUTPATIENT)
Dept: LAB | Facility: HOSPITAL | Age: 71
End: 2021-10-12
Attending: INTERNAL MEDICINE
Payer: MEDICARE

## 2021-10-12 DIAGNOSIS — C18.9 MALIGNANT NEOPLASM OF COLON, UNSPECIFIED PART OF COLON: ICD-10-CM

## 2021-10-12 DIAGNOSIS — E89.0 POSTOPERATIVE HYPOTHYROIDISM: ICD-10-CM

## 2021-10-12 LAB
BASOPHILS # BLD AUTO: 0.05 K/UL (ref 0–0.2)
BASOPHILS NFR BLD: 1.2 % (ref 0–1.9)
CHOLEST SERPL-MCNC: 190 MG/DL (ref 120–199)
CHOLEST/HDLC SERPL: 2.8 {RATIO} (ref 2–5)
DIFFERENTIAL METHOD: ABNORMAL
EOSINOPHIL # BLD AUTO: 0.4 K/UL (ref 0–0.5)
EOSINOPHIL NFR BLD: 9.6 % (ref 0–8)
ERYTHROCYTE [DISTWIDTH] IN BLOOD BY AUTOMATED COUNT: 16.4 % (ref 11.5–14.5)
HCT VFR BLD AUTO: 27.5 % (ref 37–48.5)
HDLC SERPL-MCNC: 69 MG/DL (ref 40–75)
HDLC SERPL: 36.3 % (ref 20–50)
HGB BLD-MCNC: 8 G/DL (ref 12–16)
IMM GRANULOCYTES # BLD AUTO: 0.01 K/UL (ref 0–0.04)
IMM GRANULOCYTES NFR BLD AUTO: 0.2 % (ref 0–0.5)
LDLC SERPL CALC-MCNC: 112.8 MG/DL (ref 63–159)
LYMPHOCYTES # BLD AUTO: 1.6 K/UL (ref 1–4.8)
LYMPHOCYTES NFR BLD: 39 % (ref 18–48)
MCH RBC QN AUTO: 24.8 PG (ref 27–31)
MCHC RBC AUTO-ENTMCNC: 29.1 G/DL (ref 32–36)
MCV RBC AUTO: 85 FL (ref 82–98)
MONOCYTES # BLD AUTO: 0.4 K/UL (ref 0.3–1)
MONOCYTES NFR BLD: 8.6 % (ref 4–15)
NEUTROPHILS # BLD AUTO: 1.7 K/UL (ref 1.8–7.7)
NEUTROPHILS NFR BLD: 41.4 % (ref 38–73)
NONHDLC SERPL-MCNC: 121 MG/DL
NRBC BLD-RTO: 0 /100 WBC
PLATELET # BLD AUTO: 363 K/UL (ref 150–450)
PMV BLD AUTO: 10.5 FL (ref 9.2–12.9)
RBC # BLD AUTO: 3.22 M/UL (ref 4–5.4)
TRIGL SERPL-MCNC: 41 MG/DL (ref 30–150)
WBC # BLD AUTO: 4.18 K/UL (ref 3.9–12.7)

## 2021-10-12 PROCEDURE — 85025 COMPLETE CBC W/AUTO DIFF WBC: CPT | Mod: HCNC | Performed by: INTERNAL MEDICINE

## 2021-10-12 PROCEDURE — 80061 LIPID PANEL: CPT | Mod: HCNC | Performed by: INTERNAL MEDICINE

## 2021-10-12 PROCEDURE — 36415 COLL VENOUS BLD VENIPUNCTURE: CPT | Mod: HCNC | Performed by: INTERNAL MEDICINE

## 2021-10-13 ENCOUNTER — TELEPHONE (OUTPATIENT)
Dept: PRIMARY CARE CLINIC | Facility: CLINIC | Age: 71
End: 2021-10-13

## 2021-10-13 DIAGNOSIS — D50.9 IRON DEFICIENCY ANEMIA, UNSPECIFIED IRON DEFICIENCY ANEMIA TYPE: Primary | ICD-10-CM

## 2021-10-14 ENCOUNTER — PATIENT MESSAGE (OUTPATIENT)
Dept: PRIMARY CARE CLINIC | Facility: CLINIC | Age: 71
End: 2021-10-14
Payer: MEDICARE

## 2021-11-03 ENCOUNTER — PATIENT MESSAGE (OUTPATIENT)
Dept: PRIMARY CARE CLINIC | Facility: CLINIC | Age: 71
End: 2021-11-03
Payer: MEDICARE

## 2021-11-09 ENCOUNTER — OFFICE VISIT (OUTPATIENT)
Dept: PRIMARY CARE CLINIC | Facility: CLINIC | Age: 71
End: 2021-11-09
Payer: MEDICARE

## 2021-11-09 VITALS
TEMPERATURE: 98 F | OXYGEN SATURATION: 98 % | HEART RATE: 67 BPM | RESPIRATION RATE: 18 BRPM | HEIGHT: 66 IN | DIASTOLIC BLOOD PRESSURE: 77 MMHG | WEIGHT: 137.81 LBS | SYSTOLIC BLOOD PRESSURE: 99 MMHG | BODY MASS INDEX: 22.15 KG/M2

## 2021-11-09 DIAGNOSIS — Z01.818 PREOP EXAMINATION: Primary | ICD-10-CM

## 2021-11-09 DIAGNOSIS — D50.9 IRON DEFICIENCY ANEMIA, UNSPECIFIED IRON DEFICIENCY ANEMIA TYPE: ICD-10-CM

## 2021-11-09 DIAGNOSIS — Z12.31 SCREENING MAMMOGRAM, ENCOUNTER FOR: ICD-10-CM

## 2021-11-09 DIAGNOSIS — C18.9 MALIGNANT NEOPLASM OF COLON, UNSPECIFIED PART OF COLON: ICD-10-CM

## 2021-11-09 DIAGNOSIS — E89.0 POSTOPERATIVE HYPOTHYROIDISM: ICD-10-CM

## 2021-11-09 DIAGNOSIS — F32.A DEPRESSION, UNSPECIFIED DEPRESSION TYPE: ICD-10-CM

## 2021-11-09 PROCEDURE — 3078F DIAST BP <80 MM HG: CPT | Mod: HCNC,CPTII,S$GLB, | Performed by: INTERNAL MEDICINE

## 2021-11-09 PROCEDURE — 3288F PR FALLS RISK ASSESSMENT DOCUMENTED: ICD-10-PCS | Mod: HCNC,CPTII,S$GLB, | Performed by: INTERNAL MEDICINE

## 2021-11-09 PROCEDURE — 3008F BODY MASS INDEX DOCD: CPT | Mod: HCNC,CPTII,S$GLB, | Performed by: INTERNAL MEDICINE

## 2021-11-09 PROCEDURE — 99214 OFFICE O/P EST MOD 30 MIN: CPT | Mod: HCNC,S$GLB,, | Performed by: INTERNAL MEDICINE

## 2021-11-09 PROCEDURE — 1126F AMNT PAIN NOTED NONE PRSNT: CPT | Mod: HCNC,CPTII,S$GLB, | Performed by: INTERNAL MEDICINE

## 2021-11-09 PROCEDURE — 1159F PR MEDICATION LIST DOCUMENTED IN MEDICAL RECORD: ICD-10-PCS | Mod: HCNC,CPTII,S$GLB, | Performed by: INTERNAL MEDICINE

## 2021-11-09 PROCEDURE — 1101F PR PT FALLS ASSESS DOC 0-1 FALLS W/OUT INJ PAST YR: ICD-10-PCS | Mod: HCNC,CPTII,S$GLB, | Performed by: INTERNAL MEDICINE

## 2021-11-09 PROCEDURE — 3288F FALL RISK ASSESSMENT DOCD: CPT | Mod: HCNC,CPTII,S$GLB, | Performed by: INTERNAL MEDICINE

## 2021-11-09 PROCEDURE — 99499 UNLISTED E&M SERVICE: CPT | Mod: S$GLB,,, | Performed by: INTERNAL MEDICINE

## 2021-11-09 PROCEDURE — 1160F RVW MEDS BY RX/DR IN RCRD: CPT | Mod: HCNC,CPTII,S$GLB, | Performed by: INTERNAL MEDICINE

## 2021-11-09 PROCEDURE — 99999 PR PBB SHADOW E&M-EST. PATIENT-LVL IV: ICD-10-PCS | Mod: PBBFAC,HCNC,, | Performed by: INTERNAL MEDICINE

## 2021-11-09 PROCEDURE — 99999 PR PBB SHADOW E&M-EST. PATIENT-LVL IV: CPT | Mod: PBBFAC,HCNC,, | Performed by: INTERNAL MEDICINE

## 2021-11-09 PROCEDURE — 3074F SYST BP LT 130 MM HG: CPT | Mod: HCNC,CPTII,S$GLB, | Performed by: INTERNAL MEDICINE

## 2021-11-09 PROCEDURE — 3074F PR MOST RECENT SYSTOLIC BLOOD PRESSURE < 130 MM HG: ICD-10-PCS | Mod: HCNC,CPTII,S$GLB, | Performed by: INTERNAL MEDICINE

## 2021-11-09 PROCEDURE — 1126F PR PAIN SEVERITY QUANTIFIED, NO PAIN PRESENT: ICD-10-PCS | Mod: HCNC,CPTII,S$GLB, | Performed by: INTERNAL MEDICINE

## 2021-11-09 PROCEDURE — 99214 PR OFFICE/OUTPT VISIT, EST, LEVL IV, 30-39 MIN: ICD-10-PCS | Mod: HCNC,S$GLB,, | Performed by: INTERNAL MEDICINE

## 2021-11-09 PROCEDURE — 1159F MED LIST DOCD IN RCRD: CPT | Mod: HCNC,CPTII,S$GLB, | Performed by: INTERNAL MEDICINE

## 2021-11-09 PROCEDURE — 3078F PR MOST RECENT DIASTOLIC BLOOD PRESSURE < 80 MM HG: ICD-10-PCS | Mod: HCNC,CPTII,S$GLB, | Performed by: INTERNAL MEDICINE

## 2021-11-09 PROCEDURE — 3008F PR BODY MASS INDEX (BMI) DOCUMENTED: ICD-10-PCS | Mod: HCNC,CPTII,S$GLB, | Performed by: INTERNAL MEDICINE

## 2021-11-09 PROCEDURE — 1101F PT FALLS ASSESS-DOCD LE1/YR: CPT | Mod: HCNC,CPTII,S$GLB, | Performed by: INTERNAL MEDICINE

## 2021-11-09 PROCEDURE — 99499 RISK ADDL DX/OHS AUDIT: ICD-10-PCS | Mod: S$GLB,,, | Performed by: INTERNAL MEDICINE

## 2021-11-09 PROCEDURE — 1160F PR REVIEW ALL MEDS BY PRESCRIBER/CLIN PHARMACIST DOCUMENTED: ICD-10-PCS | Mod: HCNC,CPTII,S$GLB, | Performed by: INTERNAL MEDICINE

## 2021-11-09 RX ORDER — IBANDRONATE SODIUM 150 MG/1
TABLET, FILM COATED ORAL
COMMUNITY
Start: 2021-10-05 | End: 2022-11-17

## 2021-11-12 ENCOUNTER — LAB VISIT (OUTPATIENT)
Dept: LAB | Facility: HOSPITAL | Age: 71
End: 2021-11-12
Attending: INTERNAL MEDICINE
Payer: MEDICARE

## 2021-11-12 DIAGNOSIS — D50.9 IRON DEFICIENCY ANEMIA, UNSPECIFIED IRON DEFICIENCY ANEMIA TYPE: ICD-10-CM

## 2021-11-12 DIAGNOSIS — C18.9 MALIGNANT NEOPLASM OF COLON, UNSPECIFIED PART OF COLON: ICD-10-CM

## 2021-11-12 DIAGNOSIS — E89.0 POSTOPERATIVE HYPOTHYROIDISM: ICD-10-CM

## 2021-11-12 LAB
ANION GAP SERPL CALC-SCNC: 8 MMOL/L (ref 8–16)
BASOPHILS # BLD AUTO: 0.07 K/UL (ref 0–0.2)
BASOPHILS NFR BLD: 1.7 % (ref 0–1.9)
BUN SERPL-MCNC: 15 MG/DL (ref 8–23)
CALCIUM SERPL-MCNC: 9.2 MG/DL (ref 8.7–10.5)
CHLORIDE SERPL-SCNC: 106 MMOL/L (ref 95–110)
CO2 SERPL-SCNC: 25 MMOL/L (ref 23–29)
CREAT SERPL-MCNC: 0.7 MG/DL (ref 0.5–1.4)
DIFFERENTIAL METHOD: ABNORMAL
EOSINOPHIL # BLD AUTO: 0.1 K/UL (ref 0–0.5)
EOSINOPHIL NFR BLD: 2 % (ref 0–8)
ERYTHROCYTE [DISTWIDTH] IN BLOOD BY AUTOMATED COUNT: 20.6 % (ref 11.5–14.5)
EST. GFR  (AFRICAN AMERICAN): >60 ML/MIN/1.73 M^2
EST. GFR  (NON AFRICAN AMERICAN): >60 ML/MIN/1.73 M^2
GLUCOSE SERPL-MCNC: 94 MG/DL (ref 70–110)
HCT VFR BLD AUTO: 39.9 % (ref 37–48.5)
HGB BLD-MCNC: 11.9 G/DL (ref 12–16)
IMM GRANULOCYTES # BLD AUTO: 0 K/UL (ref 0–0.04)
IMM GRANULOCYTES NFR BLD AUTO: 0 % (ref 0–0.5)
LYMPHOCYTES # BLD AUTO: 1.5 K/UL (ref 1–4.8)
LYMPHOCYTES NFR BLD: 38.4 % (ref 18–48)
MCH RBC QN AUTO: 27.5 PG (ref 27–31)
MCHC RBC AUTO-ENTMCNC: 29.8 G/DL (ref 32–36)
MCV RBC AUTO: 92 FL (ref 82–98)
MONOCYTES # BLD AUTO: 0.4 K/UL (ref 0.3–1)
MONOCYTES NFR BLD: 10.5 % (ref 4–15)
NEUTROPHILS # BLD AUTO: 1.9 K/UL (ref 1.8–7.7)
NEUTROPHILS NFR BLD: 47.4 % (ref 38–73)
NRBC BLD-RTO: 0 /100 WBC
PLATELET # BLD AUTO: 247 K/UL (ref 150–450)
PMV BLD AUTO: 11 FL (ref 9.2–12.9)
POTASSIUM SERPL-SCNC: 4.2 MMOL/L (ref 3.5–5.1)
RBC # BLD AUTO: 4.32 M/UL (ref 4–5.4)
SODIUM SERPL-SCNC: 139 MMOL/L (ref 136–145)
T4 FREE SERPL-MCNC: 0.8 NG/DL (ref 0.71–1.51)
TSH SERPL DL<=0.005 MIU/L-ACNC: 3.56 UIU/ML (ref 0.4–4)
WBC # BLD AUTO: 4.01 K/UL (ref 3.9–12.7)

## 2021-11-12 PROCEDURE — 84443 ASSAY THYROID STIM HORMONE: CPT | Mod: HCNC | Performed by: INTERNAL MEDICINE

## 2021-11-12 PROCEDURE — 80048 BASIC METABOLIC PNL TOTAL CA: CPT | Mod: HCNC | Performed by: INTERNAL MEDICINE

## 2021-11-12 PROCEDURE — 36415 COLL VENOUS BLD VENIPUNCTURE: CPT | Mod: HCNC | Performed by: INTERNAL MEDICINE

## 2021-11-12 PROCEDURE — 85025 COMPLETE CBC W/AUTO DIFF WBC: CPT | Mod: HCNC | Performed by: INTERNAL MEDICINE

## 2021-11-12 PROCEDURE — 84439 ASSAY OF FREE THYROXINE: CPT | Mod: HCNC | Performed by: INTERNAL MEDICINE

## 2021-11-16 ENCOUNTER — PATIENT MESSAGE (OUTPATIENT)
Dept: PRIMARY CARE CLINIC | Facility: CLINIC | Age: 71
End: 2021-11-16
Payer: MEDICARE

## 2021-12-06 ENCOUNTER — PATIENT MESSAGE (OUTPATIENT)
Dept: PRIMARY CARE CLINIC | Facility: CLINIC | Age: 71
End: 2021-12-06
Payer: MEDICARE

## 2021-12-19 ENCOUNTER — PATIENT MESSAGE (OUTPATIENT)
Dept: PRIMARY CARE CLINIC | Facility: CLINIC | Age: 71
End: 2021-12-19
Payer: MEDICARE

## 2021-12-21 ENCOUNTER — PATIENT OUTREACH (OUTPATIENT)
Dept: ADMINISTRATIVE | Facility: HOSPITAL | Age: 71
End: 2021-12-21
Payer: MEDICARE

## 2021-12-23 ENCOUNTER — TELEPHONE (OUTPATIENT)
Dept: PRIMARY CARE CLINIC | Facility: CLINIC | Age: 71
End: 2021-12-23

## 2021-12-23 ENCOUNTER — OFFICE VISIT (OUTPATIENT)
Dept: URGENT CARE | Facility: CLINIC | Age: 71
End: 2021-12-23
Payer: MEDICARE

## 2021-12-23 VITALS
RESPIRATION RATE: 20 BRPM | BODY MASS INDEX: 22.02 KG/M2 | SYSTOLIC BLOOD PRESSURE: 124 MMHG | TEMPERATURE: 99 F | WEIGHT: 137 LBS | HEIGHT: 66 IN | OXYGEN SATURATION: 97 % | HEART RATE: 84 BPM | DIASTOLIC BLOOD PRESSURE: 70 MMHG

## 2021-12-23 DIAGNOSIS — R50.9 FEVER, UNSPECIFIED FEVER CAUSE: ICD-10-CM

## 2021-12-23 DIAGNOSIS — U07.1 COVID-19: Primary | ICD-10-CM

## 2021-12-23 LAB
CTP QC/QA: YES
CTP QC/QA: YES
POC MOLECULAR INFLUENZA A AGN: NEGATIVE
POC MOLECULAR INFLUENZA B AGN: NEGATIVE
SARS-COV-2 RDRP RESP QL NAA+PROBE: POSITIVE

## 2021-12-23 PROCEDURE — 1160F PR REVIEW ALL MEDS BY PRESCRIBER/CLIN PHARMACIST DOCUMENTED: ICD-10-PCS | Mod: CPTII,S$GLB,, | Performed by: PHYSICIAN ASSISTANT

## 2021-12-23 PROCEDURE — 1160F RVW MEDS BY RX/DR IN RCRD: CPT | Mod: CPTII,S$GLB,, | Performed by: PHYSICIAN ASSISTANT

## 2021-12-23 PROCEDURE — U0002 COVID-19 LAB TEST NON-CDC: HCPCS | Mod: QW,S$GLB,, | Performed by: PHYSICIAN ASSISTANT

## 2021-12-23 PROCEDURE — 3008F PR BODY MASS INDEX (BMI) DOCUMENTED: ICD-10-PCS | Mod: CPTII,S$GLB,, | Performed by: PHYSICIAN ASSISTANT

## 2021-12-23 PROCEDURE — 3008F BODY MASS INDEX DOCD: CPT | Mod: CPTII,S$GLB,, | Performed by: PHYSICIAN ASSISTANT

## 2021-12-23 PROCEDURE — 1159F MED LIST DOCD IN RCRD: CPT | Mod: CPTII,S$GLB,, | Performed by: PHYSICIAN ASSISTANT

## 2021-12-23 PROCEDURE — 87502 INFLUENZA DNA AMP PROBE: CPT | Mod: QW,S$GLB,, | Performed by: PHYSICIAN ASSISTANT

## 2021-12-23 PROCEDURE — 99213 PR OFFICE/OUTPT VISIT, EST, LEVL III, 20-29 MIN: ICD-10-PCS | Mod: S$GLB,,, | Performed by: PHYSICIAN ASSISTANT

## 2021-12-23 PROCEDURE — 3078F DIAST BP <80 MM HG: CPT | Mod: CPTII,S$GLB,, | Performed by: PHYSICIAN ASSISTANT

## 2021-12-23 PROCEDURE — 3074F PR MOST RECENT SYSTOLIC BLOOD PRESSURE < 130 MM HG: ICD-10-PCS | Mod: CPTII,S$GLB,, | Performed by: PHYSICIAN ASSISTANT

## 2021-12-23 PROCEDURE — U0002: ICD-10-PCS | Mod: QW,S$GLB,, | Performed by: PHYSICIAN ASSISTANT

## 2021-12-23 PROCEDURE — 87502 POCT INFLUENZA A/B MOLECULAR: ICD-10-PCS | Mod: QW,S$GLB,, | Performed by: PHYSICIAN ASSISTANT

## 2021-12-23 PROCEDURE — 3078F PR MOST RECENT DIASTOLIC BLOOD PRESSURE < 80 MM HG: ICD-10-PCS | Mod: CPTII,S$GLB,, | Performed by: PHYSICIAN ASSISTANT

## 2021-12-23 PROCEDURE — 1159F PR MEDICATION LIST DOCUMENTED IN MEDICAL RECORD: ICD-10-PCS | Mod: CPTII,S$GLB,, | Performed by: PHYSICIAN ASSISTANT

## 2021-12-23 PROCEDURE — 99213 OFFICE O/P EST LOW 20 MIN: CPT | Mod: S$GLB,,, | Performed by: PHYSICIAN ASSISTANT

## 2021-12-23 PROCEDURE — 3074F SYST BP LT 130 MM HG: CPT | Mod: CPTII,S$GLB,, | Performed by: PHYSICIAN ASSISTANT

## 2022-04-13 ENCOUNTER — PES CALL (OUTPATIENT)
Dept: ADMINISTRATIVE | Facility: CLINIC | Age: 72
End: 2022-04-13
Payer: MEDICARE

## 2022-04-25 ENCOUNTER — PATIENT MESSAGE (OUTPATIENT)
Dept: PRIMARY CARE CLINIC | Facility: CLINIC | Age: 72
End: 2022-04-25
Payer: MEDICARE

## 2022-04-25 ENCOUNTER — IMMUNIZATION (OUTPATIENT)
Dept: PRIMARY CARE CLINIC | Facility: CLINIC | Age: 72
End: 2022-04-25
Payer: MEDICARE

## 2022-04-25 DIAGNOSIS — Z23 NEED FOR VACCINATION: Primary | ICD-10-CM

## 2022-04-25 PROCEDURE — 91306 COVID-19, MRNA, LNP-S, PF, 100 MCG/0.25 ML DOSE VACCINE (MODERNA BOOSTER): CPT | Mod: PBBFAC | Performed by: INTERNAL MEDICINE

## 2022-04-25 PROCEDURE — 0064A COVID-19, MRNA, LNP-S, PF, 100 MCG/0.25 ML DOSE VACCINE (MODERNA BOOSTER): CPT | Mod: CV19,PBBFAC | Performed by: INTERNAL MEDICINE

## 2022-05-09 NOTE — PROGRESS NOTES
[facility] Note    Chief Complaint      Chief Complaint   Patient presents with    Pre-op Exam       History of Present Illness      Ana Lilia Glass is a 71 y.o.  F with Depression, Hypothyroidism, Insomnia, AR, IBS/Chronic Contipation, Vaginal Atrophy, GERD, Hiatal hernia, colon polyps, and h/o Osteoporosis presents to fu chronic isssues. Referred by daughter in law Dr. Sofiya Crespo. Last visit - 11/12/21    H/o COVID -19 - 12/23/21 - no lingering effects.      Preop exam  - She is sched for a partial keratectomy Salzmann's nodular dystrophy sched for 12/2/21.      Anemia - H/H - 11.9.39.9 improved from prev 8/27.5 which is low but improved from a Hb of 5. She is on iron suppl BID.       Depression - Pt on Lexapro 10 mg/d.  Fu by Gurdeep Bourgeois. Stable.      Hypothyroidism - Pt on Levothyroxine 200 mcg/d. controled per June labs. Will repeat since she had a partial colectomy.      Palpitations - Improved. D/w pt if worsens she should alert me.  I reviewed her prev Cardiac burgos (holter- rare PAC and 1 PVC, Ca score - 0, Stress Echo - neg for ischemia, and EKG - NSR) from 2014.      Insomnia - Pt on Lunesta prn 1-2/wk. Fu by Gurdeep Bourgeois     Allergic Rhinitis - Doing well.       IBS/Chronic Constipation - Resolved.  She walks her dog 2/day. She sometimes gets diarrhea now and tries to eat smaller meals since her Bowel Sx.      Vaginal Atrophy - Fu by UroGYN.  Pt in Pelvic Floor PTx. Pt on Estrace cream 2/wk.      GERD/Hiatal hernia - Not an issue.  Doing well s/p wt loss.      Colon Ca - Fu by Dr. Young.  S/p resection.      H/o Osteoporosis now Osteopenia - Pt off Calcium and is on Vit D. Pt on Boniva monthly.      HCM - Flu - 11/12/21;   Tdap - 3/9/17;  PCV 13 - 4/6/18;  PVX 23 - 4/9/19;  Shingrix - 4/12/18 and 10/10/18;  Zostavax - 10/10/18;  COVID - 19 Vaccine (Moderna) #1 2/9/21; #2 3/9/21; and # 3 - 10/8/21; #4 - 4/25/22;  MGM - 7/22/21 at [facility] with [facility] -repeat 1 yr;  DEXA - 7/22/21  +Osteopenia ;  PAP -6/24/21 - neg per pt; Hep C Screen - 3/16/16 - neg.;  C-scope - 8/18/21+ Cancerous polyp - repeat 1 yr;  Prev PCP - Dr. Momin; Ortho - Dr. Pedraza; OB/GYN - Dr. Gabriel; GI - Dr. Quiroga; Uro/GYN - Dr. Palencia; CRS - Dr. Young; Ophtho - Dr. Silva; Cornea Sp - Dr. Vaelra        Patient Care Team:  Diamante Fletcher MD as PCP - General (Internal Medicine)  Adryan Pedraza MD as Consulting Provider (Orthopedic Surgery)  Ranjana Gabriel MD as Consulting Physician (Gynecology)  Gurdeep Fernandes NP as Consulting Provider (Psychiatry)  León Jain MD as Consulting Physician (Radiology)  Isabella Raman MA as Care Coordinator     Health Maintenance:  Immunization History   Administered Date(s) Administered    COVID-19 MRNA, LN-S PF (MODERNA HALF 0.25 ML DOSE) 04/25/2022    COVID-19, MRNA, LN-S, PF (MODERNA FULL 0.5 ML DOSE) 03/09/2021, 10/08/2021    COVID-19, rS-ChAdOx1, PF ([employer]) 02/09/2021    Influenza 10/28/2019, 10/05/2020    Influenza (FLUAD) - Quadrivalent - Adjuvanted - PF *Preferred* (65+) 11/12/2021    Influenza (FLUAD) - Trivalent - Adjuvanted - PF (65+) 09/20/2018    Influenza - High Dose - PF (65 years and older) 02/21/2019, 10/10/2019    Influenza - Quadrivalent - High Dose - PF (65 years and older) 09/29/2020    Pneumococcal Conjugate - 13 Valent 04/06/2018    Pneumococcal Polysaccharide - 23 Valent 04/09/2019    Tdap 03/09/2017    Zoster 01/26/2013    Zoster Recombinant 04/12/2018, 10/10/2018      Health Maintenance   Topic Date Due    Mammogram  07/23/2022    DEXA Scan  07/22/2024    Lipid Panel  10/12/2026    TETANUS VACCINE  03/09/2027    Hepatitis C Screening  Completed        Past Medical History:  Past Medical History:   Diagnosis Date    Colon polyps     Hiatal hernia     Internal hemorrhoids     Nonintractable episodic headache 3/8/2016    Osteoporosis        Past Surgical History:   has a past surgical history that includes Total  thyroidectomy (); Colonoscopy (); Hemorrhoid surgery (2016); Hysterectomy (2016); Appendectomy (); and TONSILLECTOMY, ADENOIDECTOMY.    Family History:  family history includes Alcohol abuse in her brother and father; Atrial fibrillation in her cousin; COPD in her mother; Dementia in her maternal aunt and maternal grandmother; Heart attack (age of onset: 49) in her paternal uncle; Heart attack (age of onset: 56) in her father; Heart attack (age of onset: 59) in her cousin; Heart disease in her brother, father, mother, and paternal uncle; Hypertension in her father.     Social History:  Social History     Tobacco Use    Smoking status: Former Smoker     Packs/day: 1.50     Years: 18.00     Pack years: 27.00     Types: Cigarettes     Quit date: 1997     Years since quittin.3    Smokeless tobacco: Never Used   Substance Use Topics    Alcohol use: Not Currently     Comment: quit drinking alcohol 21    Drug use: No       Review of Systems   Constitutional: Positive for diaphoresis. Negative for chills and fever.   HENT: Negative for nasal congestion and sore throat.    Eyes: Positive for visual disturbance.        Wears glasses.   Respiratory: Negative for cough and shortness of breath.    Cardiovascular: Negative for chest pain and palpitations.   Gastrointestinal: Positive for diarrhea. Negative for anal bleeding, constipation, nausea and vomiting.   Endocrine: Positive for heat intolerance.   Genitourinary: Negative for dysuria and frequency.   Musculoskeletal: Positive for arthralgias. Negative for myalgias.        Left hip bursitis - got an inj last wk which has helped.    Neurological: Positive for headaches. Negative for dizziness.        Assoc with allergies. She takes claritin prn.    Psychiatric/Behavioral: Negative for dysphoric mood. The patient is not nervous/anxious.         Medications:    Current Outpatient Medications:     EScitalopram oxalate (LEXAPRO) 10 MG tablet, ,  "Disp: , Rfl:     estradioL (ESTRACE) 0.01 % (0.1 mg/gram) vaginal cream, 0.5 grams with applicator or dime-sized amount with finger in vagina nightly x 2 weeks, then twice a week thereafter, Disp: 42.5 g, Rfl: 11    eszopiclone (LUNESTA) 2 MG Tab, Take 1 tablet (2 mg total) by mouth nightly as needed., Disp: 90 tablet, Rfl: 1    ibandronate (BONIVA) 150 mg tablet, , Disp: , Rfl:     levothyroxine (SYNTHROID) 200 MCG tablet, TAKE ONE TABLET BY MOUTH EVERY DAY BEFORE BREAKFAST, Disp: 90 tablet, Rfl: 3    naproxen (NAPROSYN) 500 MG tablet, Take 500 mg by mouth 2 (two) times daily as needed., Disp: , Rfl:     triamcinolone acetonide 0.1% (KENALOG) 0.1 % cream, , Disp: , Rfl:     fluticasone propionate (FLONASE) 50 mcg/actuation nasal spray, 2 sprays (100 mcg total) by Each Nostril route once daily., Disp: 16 g, Rfl: 5     Allergies:  Review of patient's allergies indicates:   Allergen Reactions    Bactrim [sulfamethoxazole-trimethoprim] Other (See Comments)     Unknown    Codeine Itching and Rash    Sulfa (sulfonamide antibiotics) Other (See Comments)     Pt was 5yrs old       Physical Exam      Vital Signs  Temp: 98 °F (36.7 °C)  Pulse: (!) 53  Resp: 18  SpO2: 98 %  BP: 110/60  BP Location: Left arm  Patient Position: Sitting  Pain Score: 0-No pain  Height and Weight  Height: 5' 6" (167.6 cm)  Weight: 62.7 kg (138 lb 3.7 oz)  BSA (Calculated - sq m): 1.71 sq meters  BMI (Calculated): 22.3  Weight in (lb) to have BMI = 25: 154.6      Patient Position: Sitting      Vitals:    05/10/22 1253 05/10/22 1334 05/10/22 1335 05/10/22 1336   BP: 110/60 116/65 98/64 (P) 111/66   BP Location: Left arm Left arm Left arm (P) Left arm   Patient Position: Sitting Lying Sitting (P) Standing   BP Method: Medium (Manual)      Pulse: (!) 53 (!) 52 (!) 55 (P) 60                                          Physical Exam  Vitals reviewed.   Constitutional:       General: She is not in acute distress.     Appearance: Normal appearance. " She is not ill-appearing, toxic-appearing or diaphoretic.   HENT:      Head: Normocephalic and atraumatic.      Right Ear: Tympanic membrane normal.      Left Ear: Tympanic membrane normal.      Mouth/Throat:      Mouth: Mucous membranes are moist.      Pharynx: No posterior oropharyngeal erythema.   Eyes:      General:         Right eye: No discharge.      Extraocular Movements: Extraocular movements intact.      Conjunctiva/sclera: Conjunctivae normal.   Neck:      Vascular: No carotid bruit.   Cardiovascular:      Rate and Rhythm: Bradycardia present.      Pulses: Normal pulses.      Heart sounds: Normal heart sounds.   Pulmonary:      Effort: Pulmonary effort is normal. No respiratory distress.      Breath sounds: Normal breath sounds.   Abdominal:      General: Bowel sounds are normal. There is no distension.      Palpations: Abdomen is soft.      Tenderness: There is no abdominal tenderness. There is no guarding or rebound.   Skin:     General: Skin is warm and dry.   Neurological:      General: No focal deficit present.      Mental Status: She is alert and oriented to person, place, and time.   Psychiatric:         Mood and Affect: Mood normal.         Behavior: Behavior normal.          Laboratory:  CBC:  Recent Labs   Lab 06/05/20  0845 10/12/21  1012 11/12/21  0937   WBC 4.96 4.18 4.01   RBC 4.78 3.22 L 4.32   Hemoglobin 14.4 8.0 L 11.9 L   Hematocrit 45.4 27.5 L 39.9   Platelets 309 363 247   MCV 95 85 92   MCH 30.1 24.8 L 27.5   MCHC 31.7 L 29.1 L 29.8 L       CMP:  Recent Labs   Lab 06/05/20  0845 06/23/21  1100 11/12/21  0937   Glucose 101 86 94   Calcium 9.8 9.5 9.2   Albumin 3.9 4.0  --    Total Protein 6.7 6.7  --    Sodium 143 140 139   Potassium 4.4 4.1 4.2   CO2 27 26 25   Chloride 108 105 106   BUN 11 14 15   Creatinine 0.8 0.8 0.7   Alkaline Phosphatase 79 69  --    ALT 16 8 L  --    AST 19 16  --    Total Bilirubin 0.5 0.4  --           LIPIDS:  Recent Labs   Lab 06/05/20  0845 09/11/20  0853  11/30/20  0902 02/04/21  1436 06/23/21  1100 10/12/21  1012 11/12/21  0937   TSH 0.026 L 0.049 L   < > 1.706 1.915  --  3.560   HDL 67 63  --   --   --  69  --    Cholesterol 222 H 224 H  --   --   --  190  --    Triglycerides 83 73  --   --   --  41  --    LDL Cholesterol 138.4 146.4  --   --   --  112.8  --    HDL/Cholesterol Ratio 30.2 28.1  --   --   --  36.3  --    Non-HDL Cholesterol 155 161  --   --   --  121  --    Total Cholesterol/HDL Ratio 3.3 3.6  --   --   --  2.8  --     < > = values in this interval not displayed.       TSH:  Recent Labs   Lab 02/04/21  1436 06/23/21  1100 11/12/21  0937   TSH 1.706 1.915 3.560         Assessment/Plan     Ana Lilia Glass is a 71 y.o.female with:    Preop examination  - Performed today.  I filled out paper work.  Pt is clear.     Seasonal allergic rhinitis due to pollen  -     fluticasone propionate (FLONASE) 50 mcg/actuation nasal spray; 2 sprays (100 mcg total) by Each Nostril route once daily.  Dispense: 16 g; Refill: 5  - Stable.  Cont current regimen.    Dizziness  -     Orthostatic vital signs  - Will check Orthost[facility]. Rec adeq hydration. Cont to rise slowly. She is cautious with extending her neck.     Malignant neoplasm of colon, unspecified part of colon  -     Comprehensive Metabolic Panel; Future; Expected date: 11/10/2022    Iron deficiency anemia, unspecified iron deficiency anemia type  -     CBC Auto Differential; Future; Expected date: 11/10/2022    Depression, unspecified depression type  - Stable.  Cont current regimen.    Postoperative hypothyroidism  -     TSH; Future; Expected date: 11/10/2022  - Controlled.  Cont current.        Chronic conditions status updated as per HPI.  Other than changes above, cont current medications and maintain follow up with specialists.  Follow up in about 6 months (around 11/10/2022) for fu chronic issues or sooner if needed.      Diamante Fletcher MD  [facility]

## 2022-05-10 ENCOUNTER — OFFICE VISIT (OUTPATIENT)
Dept: PRIMARY CARE CLINIC | Facility: CLINIC | Age: 72
End: 2022-05-10
Payer: MEDICARE

## 2022-05-10 VITALS
WEIGHT: 138.25 LBS | BODY MASS INDEX: 22.22 KG/M2 | HEIGHT: 66 IN | TEMPERATURE: 98 F | OXYGEN SATURATION: 98 % | SYSTOLIC BLOOD PRESSURE: 98 MMHG | HEART RATE: 55 BPM | RESPIRATION RATE: 18 BRPM | DIASTOLIC BLOOD PRESSURE: 64 MMHG

## 2022-05-10 DIAGNOSIS — F32.A DEPRESSION, UNSPECIFIED DEPRESSION TYPE: ICD-10-CM

## 2022-05-10 DIAGNOSIS — C18.9 MALIGNANT NEOPLASM OF COLON, UNSPECIFIED PART OF COLON: ICD-10-CM

## 2022-05-10 DIAGNOSIS — D50.9 IRON DEFICIENCY ANEMIA, UNSPECIFIED IRON DEFICIENCY ANEMIA TYPE: ICD-10-CM

## 2022-05-10 DIAGNOSIS — Z01.818 PREOP EXAMINATION: Primary | ICD-10-CM

## 2022-05-10 DIAGNOSIS — E89.0 POSTOPERATIVE HYPOTHYROIDISM: ICD-10-CM

## 2022-05-10 DIAGNOSIS — J30.1 SEASONAL ALLERGIC RHINITIS DUE TO POLLEN: ICD-10-CM

## 2022-05-10 DIAGNOSIS — R42 DIZZINESS: ICD-10-CM

## 2022-05-10 PROCEDURE — 99214 OFFICE O/P EST MOD 30 MIN: CPT | Mod: S$GLB,,, | Performed by: INTERNAL MEDICINE

## 2022-05-10 PROCEDURE — 1159F PR MEDICATION LIST DOCUMENTED IN MEDICAL RECORD: ICD-10-PCS | Mod: CPTII,S$GLB,, | Performed by: INTERNAL MEDICINE

## 2022-05-10 PROCEDURE — 3074F PR MOST RECENT SYSTOLIC BLOOD PRESSURE < 130 MM HG: ICD-10-PCS | Mod: CPTII,S$GLB,, | Performed by: INTERNAL MEDICINE

## 2022-05-10 PROCEDURE — 1159F MED LIST DOCD IN RCRD: CPT | Mod: CPTII,S$GLB,, | Performed by: INTERNAL MEDICINE

## 2022-05-10 PROCEDURE — 99499 RISK ADDL DX/OHS AUDIT: ICD-10-PCS | Mod: S$GLB,,, | Performed by: INTERNAL MEDICINE

## 2022-05-10 PROCEDURE — 3008F BODY MASS INDEX DOCD: CPT | Mod: CPTII,S$GLB,, | Performed by: INTERNAL MEDICINE

## 2022-05-10 PROCEDURE — 99214 PR OFFICE/OUTPT VISIT, EST, LEVL IV, 30-39 MIN: ICD-10-PCS | Mod: S$GLB,,, | Performed by: INTERNAL MEDICINE

## 2022-05-10 PROCEDURE — 3288F FALL RISK ASSESSMENT DOCD: CPT | Mod: CPTII,S$GLB,, | Performed by: INTERNAL MEDICINE

## 2022-05-10 PROCEDURE — 99999 PR PBB SHADOW E&M-EST. PATIENT-LVL IV: CPT | Mod: PBBFAC,,, | Performed by: INTERNAL MEDICINE

## 2022-05-10 PROCEDURE — 99999 PR PBB SHADOW E&M-EST. PATIENT-LVL IV: ICD-10-PCS | Mod: PBBFAC,,, | Performed by: INTERNAL MEDICINE

## 2022-05-10 PROCEDURE — 99499 UNLISTED E&M SERVICE: CPT | Mod: S$GLB,,, | Performed by: INTERNAL MEDICINE

## 2022-05-10 PROCEDURE — 3078F DIAST BP <80 MM HG: CPT | Mod: CPTII,S$GLB,, | Performed by: INTERNAL MEDICINE

## 2022-05-10 PROCEDURE — 1101F PT FALLS ASSESS-DOCD LE1/YR: CPT | Mod: CPTII,S$GLB,, | Performed by: INTERNAL MEDICINE

## 2022-05-10 PROCEDURE — 1160F PR REVIEW ALL MEDS BY PRESCRIBER/CLIN PHARMACIST DOCUMENTED: ICD-10-PCS | Mod: CPTII,S$GLB,, | Performed by: INTERNAL MEDICINE

## 2022-05-10 PROCEDURE — 3008F PR BODY MASS INDEX (BMI) DOCUMENTED: ICD-10-PCS | Mod: CPTII,S$GLB,, | Performed by: INTERNAL MEDICINE

## 2022-05-10 PROCEDURE — 1126F PR PAIN SEVERITY QUANTIFIED, NO PAIN PRESENT: ICD-10-PCS | Mod: CPTII,S$GLB,, | Performed by: INTERNAL MEDICINE

## 2022-05-10 PROCEDURE — 3288F PR FALLS RISK ASSESSMENT DOCUMENTED: ICD-10-PCS | Mod: CPTII,S$GLB,, | Performed by: INTERNAL MEDICINE

## 2022-05-10 PROCEDURE — 1126F AMNT PAIN NOTED NONE PRSNT: CPT | Mod: CPTII,S$GLB,, | Performed by: INTERNAL MEDICINE

## 2022-05-10 PROCEDURE — 1101F PR PT FALLS ASSESS DOC 0-1 FALLS W/OUT INJ PAST YR: ICD-10-PCS | Mod: CPTII,S$GLB,, | Performed by: INTERNAL MEDICINE

## 2022-05-10 PROCEDURE — 3078F PR MOST RECENT DIASTOLIC BLOOD PRESSURE < 80 MM HG: ICD-10-PCS | Mod: CPTII,S$GLB,, | Performed by: INTERNAL MEDICINE

## 2022-05-10 PROCEDURE — 3074F SYST BP LT 130 MM HG: CPT | Mod: CPTII,S$GLB,, | Performed by: INTERNAL MEDICINE

## 2022-05-10 PROCEDURE — 1160F RVW MEDS BY RX/DR IN RCRD: CPT | Mod: CPTII,S$GLB,, | Performed by: INTERNAL MEDICINE

## 2022-05-10 RX ORDER — FLUTICASONE PROPIONATE 50 MCG
2 SPRAY, SUSPENSION (ML) NASAL DAILY
Qty: 16 G | Refills: 5 | Status: SHIPPED | OUTPATIENT
Start: 2022-05-10 | End: 2023-09-19

## 2022-05-10 RX ORDER — NAPROXEN 500 MG/1
500 TABLET ORAL 2 TIMES DAILY PRN
COMMUNITY
Start: 2022-05-03 | End: 2022-11-17

## 2022-07-19 ENCOUNTER — PATIENT MESSAGE (OUTPATIENT)
Dept: RESEARCH | Facility: CLINIC | Age: 72
End: 2022-07-19
Payer: MEDICARE

## 2022-07-25 LAB — BCS RECOMMENDATION EXT: NORMAL

## 2022-08-01 ENCOUNTER — PES CALL (OUTPATIENT)
Dept: ADMINISTRATIVE | Facility: CLINIC | Age: 72
End: 2022-08-01
Payer: MEDICARE

## 2022-08-18 LAB — CRC RECOMMENDATION EXT: NORMAL

## 2022-09-06 ENCOUNTER — PATIENT OUTREACH (OUTPATIENT)
Dept: ADMINISTRATIVE | Facility: HOSPITAL | Age: 72
End: 2022-09-06
Payer: MEDICARE

## 2022-09-12 ENCOUNTER — PATIENT OUTREACH (OUTPATIENT)
Dept: ADMINISTRATIVE | Facility: HOSPITAL | Age: 72
End: 2022-09-12
Payer: MEDICARE

## 2022-09-12 NOTE — PROGRESS NOTES
Patient due for the following   Health Maintenance Due   Topic    COVID-19 Vaccine (4 - Booster for Moderna series)    Influenza Vaccine (1)      Received mammogram  and c-scope pathology records.  Scanned to media.  updated    Immunizations: reviewed and updated  Care Everywhere: triggered  Care Teams: up to date  Outreach: none needed

## 2022-09-20 ENCOUNTER — IMMUNIZATION (OUTPATIENT)
Dept: PRIMARY CARE CLINIC | Facility: CLINIC | Age: 72
End: 2022-09-20
Payer: MEDICARE

## 2022-09-20 DIAGNOSIS — Z23 NEED FOR VACCINATION: Primary | ICD-10-CM

## 2022-09-20 PROCEDURE — 91313 COVID-19, MRNA, LNP-S, BIVALENT BOOSTER, PF, 50 MCG/0.5 ML: CPT | Mod: PBBFAC | Performed by: INTERNAL MEDICINE

## 2022-09-20 PROCEDURE — 0134A COVID-19, MRNA, LNP-S, BIVALENT BOOSTER, PF, 50 MCG/0.5 ML: CPT | Mod: CV19,PBBFAC | Performed by: INTERNAL MEDICINE

## 2022-11-11 ENCOUNTER — TELEPHONE (OUTPATIENT)
Dept: PRIMARY CARE CLINIC | Facility: CLINIC | Age: 72
End: 2022-11-11
Payer: MEDICARE

## 2022-11-11 ENCOUNTER — LAB VISIT (OUTPATIENT)
Dept: LAB | Facility: HOSPITAL | Age: 72
End: 2022-11-11
Attending: INTERNAL MEDICINE
Payer: MEDICARE

## 2022-11-11 ENCOUNTER — PATIENT MESSAGE (OUTPATIENT)
Dept: PRIMARY CARE CLINIC | Facility: CLINIC | Age: 72
End: 2022-11-11
Payer: MEDICARE

## 2022-11-11 DIAGNOSIS — C18.9 MALIGNANT NEOPLASM OF COLON, UNSPECIFIED PART OF COLON: ICD-10-CM

## 2022-11-11 DIAGNOSIS — E89.0 POSTOPERATIVE HYPOTHYROIDISM: ICD-10-CM

## 2022-11-11 DIAGNOSIS — D50.9 IRON DEFICIENCY ANEMIA, UNSPECIFIED IRON DEFICIENCY ANEMIA TYPE: ICD-10-CM

## 2022-11-11 LAB
ALBUMIN SERPL BCP-MCNC: 4 G/DL (ref 3.5–5.2)
ALP SERPL-CCNC: 47 U/L (ref 55–135)
ALT SERPL W/O P-5'-P-CCNC: 15 U/L (ref 10–44)
ANION GAP SERPL CALC-SCNC: 9 MMOL/L (ref 8–16)
AST SERPL-CCNC: 18 U/L (ref 10–40)
BASOPHILS # BLD AUTO: 0.06 K/UL (ref 0–0.2)
BASOPHILS NFR BLD: 1.2 % (ref 0–1.9)
BILIRUB SERPL-MCNC: 0.4 MG/DL (ref 0.1–1)
BUN SERPL-MCNC: 16 MG/DL (ref 8–23)
CALCIUM SERPL-MCNC: 9.7 MG/DL (ref 8.7–10.5)
CHLORIDE SERPL-SCNC: 108 MMOL/L (ref 95–110)
CO2 SERPL-SCNC: 25 MMOL/L (ref 23–29)
CREAT SERPL-MCNC: 0.9 MG/DL (ref 0.5–1.4)
DIFFERENTIAL METHOD: ABNORMAL
EOSINOPHIL # BLD AUTO: 0.1 K/UL (ref 0–0.5)
EOSINOPHIL NFR BLD: 1 % (ref 0–8)
ERYTHROCYTE [DISTWIDTH] IN BLOOD BY AUTOMATED COUNT: 12.9 % (ref 11.5–14.5)
EST. GFR  (NO RACE VARIABLE): >60 ML/MIN/1.73 M^2
GLUCOSE SERPL-MCNC: 82 MG/DL (ref 70–110)
HCT VFR BLD AUTO: 42.2 % (ref 37–48.5)
HGB BLD-MCNC: 13.9 G/DL (ref 12–16)
IMM GRANULOCYTES # BLD AUTO: 0.01 K/UL (ref 0–0.04)
IMM GRANULOCYTES NFR BLD AUTO: 0.2 % (ref 0–0.5)
LYMPHOCYTES # BLD AUTO: 1.9 K/UL (ref 1–4.8)
LYMPHOCYTES NFR BLD: 38.5 % (ref 18–48)
MCH RBC QN AUTO: 31.4 PG (ref 27–31)
MCHC RBC AUTO-ENTMCNC: 32.9 G/DL (ref 32–36)
MCV RBC AUTO: 96 FL (ref 82–98)
MONOCYTES # BLD AUTO: 0.5 K/UL (ref 0.3–1)
MONOCYTES NFR BLD: 9.6 % (ref 4–15)
NEUTROPHILS # BLD AUTO: 2.4 K/UL (ref 1.8–7.7)
NEUTROPHILS NFR BLD: 49.5 % (ref 38–73)
NRBC BLD-RTO: 0 /100 WBC
PLATELET # BLD AUTO: 267 K/UL (ref 150–450)
PMV BLD AUTO: 10.2 FL (ref 9.2–12.9)
POTASSIUM SERPL-SCNC: 4.5 MMOL/L (ref 3.5–5.1)
PROT SERPL-MCNC: 6.7 G/DL (ref 6–8.4)
RBC # BLD AUTO: 4.42 M/UL (ref 4–5.4)
SODIUM SERPL-SCNC: 142 MMOL/L (ref 136–145)
T4 FREE SERPL-MCNC: 1.08 NG/DL (ref 0.71–1.51)
TSH SERPL DL<=0.005 MIU/L-ACNC: 4.22 UIU/ML (ref 0.4–4)
WBC # BLD AUTO: 4.91 K/UL (ref 3.9–12.7)

## 2022-11-11 PROCEDURE — 36415 COLL VENOUS BLD VENIPUNCTURE: CPT | Performed by: INTERNAL MEDICINE

## 2022-11-11 PROCEDURE — 84439 ASSAY OF FREE THYROXINE: CPT | Performed by: INTERNAL MEDICINE

## 2022-11-11 PROCEDURE — 80053 COMPREHEN METABOLIC PANEL: CPT | Performed by: INTERNAL MEDICINE

## 2022-11-11 PROCEDURE — 84443 ASSAY THYROID STIM HORMONE: CPT | Performed by: INTERNAL MEDICINE

## 2022-11-11 PROCEDURE — 85025 COMPLETE CBC W/AUTO DIFF WBC: CPT | Performed by: INTERNAL MEDICINE

## 2022-11-11 NOTE — TELEPHONE ENCOUNTER
I sw pt and went over her results. She does not miss any doses of thyroid meds. States she has an appt nx week with her new pcp and will further discuss the results with him

## 2022-11-11 NOTE — PROGRESS NOTES
Please inform pt -  I reviewed your labs.  Your Tsh was slightly elevated 4.220 and Free T4 was normal. Have you missed any doses of your levothyroxine and if so how often? Your kidney function and liver functions looked good.  You are not anemic. No further recommendations at this time.    Dr. RAM

## 2022-11-11 NOTE — TELEPHONE ENCOUNTER
----- Message from Diamante Fletcher MD sent at 11/11/2022  2:45 PM CST -----  Please inform pt -  I reviewed your labs.  Your Tsh was slightly elevated 4.220 and Free T4 was normal. Have you missed any doses of your levothyroxine and if so how often? Your kidney function and liver functions looked good.  You are not anemic. No further recommendations at this time.    Dr. RAM

## 2022-11-17 ENCOUNTER — OFFICE VISIT (OUTPATIENT)
Dept: PRIMARY CARE CLINIC | Facility: CLINIC | Age: 72
End: 2022-11-17
Payer: MEDICARE

## 2022-11-17 VITALS
RESPIRATION RATE: 18 BRPM | HEART RATE: 64 BPM | OXYGEN SATURATION: 99 % | BODY MASS INDEX: 22.34 KG/M2 | DIASTOLIC BLOOD PRESSURE: 64 MMHG | WEIGHT: 139 LBS | HEIGHT: 66 IN | SYSTOLIC BLOOD PRESSURE: 112 MMHG | TEMPERATURE: 99 F

## 2022-11-17 DIAGNOSIS — G47.00 INSOMNIA, UNSPECIFIED TYPE: ICD-10-CM

## 2022-11-17 DIAGNOSIS — D69.2 OTHER NONTHROMBOCYTOPENIC PURPURA: ICD-10-CM

## 2022-11-17 DIAGNOSIS — F33.0 MDD (MAJOR DEPRESSIVE DISORDER), RECURRENT EPISODE, MILD: ICD-10-CM

## 2022-11-17 DIAGNOSIS — F32.A DEPRESSION, UNSPECIFIED DEPRESSION TYPE: Primary | ICD-10-CM

## 2022-11-17 DIAGNOSIS — K58.9 IRRITABLE BOWEL SYNDROME, UNSPECIFIED TYPE: ICD-10-CM

## 2022-11-17 DIAGNOSIS — C18.9 MALIGNANT NEOPLASM OF COLON, UNSPECIFIED PART OF COLON: ICD-10-CM

## 2022-11-17 DIAGNOSIS — J30.2 SEASONAL ALLERGIES: ICD-10-CM

## 2022-11-17 DIAGNOSIS — E89.0 POSTOPERATIVE HYPOTHYROIDISM: ICD-10-CM

## 2022-11-17 PROCEDURE — 99499 RISK ADDL DX/OHS AUDIT: ICD-10-PCS | Mod: S$GLB,,, | Performed by: INTERNAL MEDICINE

## 2022-11-17 PROCEDURE — 1101F PT FALLS ASSESS-DOCD LE1/YR: CPT | Mod: CPTII,S$GLB,, | Performed by: INTERNAL MEDICINE

## 2022-11-17 PROCEDURE — 1126F PR PAIN SEVERITY QUANTIFIED, NO PAIN PRESENT: ICD-10-PCS | Mod: CPTII,S$GLB,, | Performed by: INTERNAL MEDICINE

## 2022-11-17 PROCEDURE — 99499 UNLISTED E&M SERVICE: CPT | Mod: S$GLB,,, | Performed by: INTERNAL MEDICINE

## 2022-11-17 PROCEDURE — 3288F PR FALLS RISK ASSESSMENT DOCUMENTED: ICD-10-PCS | Mod: CPTII,S$GLB,, | Performed by: INTERNAL MEDICINE

## 2022-11-17 PROCEDURE — 3078F DIAST BP <80 MM HG: CPT | Mod: CPTII,S$GLB,, | Performed by: INTERNAL MEDICINE

## 2022-11-17 PROCEDURE — 3074F PR MOST RECENT SYSTOLIC BLOOD PRESSURE < 130 MM HG: ICD-10-PCS | Mod: CPTII,S$GLB,, | Performed by: INTERNAL MEDICINE

## 2022-11-17 PROCEDURE — 3074F SYST BP LT 130 MM HG: CPT | Mod: CPTII,S$GLB,, | Performed by: INTERNAL MEDICINE

## 2022-11-17 PROCEDURE — 3008F BODY MASS INDEX DOCD: CPT | Mod: CPTII,S$GLB,, | Performed by: INTERNAL MEDICINE

## 2022-11-17 PROCEDURE — 1159F PR MEDICATION LIST DOCUMENTED IN MEDICAL RECORD: ICD-10-PCS | Mod: CPTII,S$GLB,, | Performed by: INTERNAL MEDICINE

## 2022-11-17 PROCEDURE — 3078F PR MOST RECENT DIASTOLIC BLOOD PRESSURE < 80 MM HG: ICD-10-PCS | Mod: CPTII,S$GLB,, | Performed by: INTERNAL MEDICINE

## 2022-11-17 PROCEDURE — 99214 PR OFFICE/OUTPT VISIT, EST, LEVL IV, 30-39 MIN: ICD-10-PCS | Mod: S$GLB,,, | Performed by: INTERNAL MEDICINE

## 2022-11-17 PROCEDURE — 1126F AMNT PAIN NOTED NONE PRSNT: CPT | Mod: CPTII,S$GLB,, | Performed by: INTERNAL MEDICINE

## 2022-11-17 PROCEDURE — 99214 OFFICE O/P EST MOD 30 MIN: CPT | Mod: S$GLB,,, | Performed by: INTERNAL MEDICINE

## 2022-11-17 PROCEDURE — 99999 PR PBB SHADOW E&M-EST. PATIENT-LVL III: CPT | Mod: PBBFAC,,, | Performed by: INTERNAL MEDICINE

## 2022-11-17 PROCEDURE — 1159F MED LIST DOCD IN RCRD: CPT | Mod: CPTII,S$GLB,, | Performed by: INTERNAL MEDICINE

## 2022-11-17 PROCEDURE — 3288F FALL RISK ASSESSMENT DOCD: CPT | Mod: CPTII,S$GLB,, | Performed by: INTERNAL MEDICINE

## 2022-11-17 PROCEDURE — 1101F PR PT FALLS ASSESS DOC 0-1 FALLS W/OUT INJ PAST YR: ICD-10-PCS | Mod: CPTII,S$GLB,, | Performed by: INTERNAL MEDICINE

## 2022-11-17 PROCEDURE — 99999 PR PBB SHADOW E&M-EST. PATIENT-LVL III: ICD-10-PCS | Mod: PBBFAC,,, | Performed by: INTERNAL MEDICINE

## 2022-11-17 PROCEDURE — 3008F PR BODY MASS INDEX (BMI) DOCUMENTED: ICD-10-PCS | Mod: CPTII,S$GLB,, | Performed by: INTERNAL MEDICINE

## 2022-11-17 RX ORDER — BUPROPION HYDROCHLORIDE 150 MG/1
150 TABLET ORAL DAILY
Qty: 90 TABLET | Refills: 3 | Status: SHIPPED | OUTPATIENT
Start: 2022-11-17 | End: 2023-03-15 | Stop reason: SDUPTHER

## 2022-11-17 RX ORDER — FLUTICASONE PROPIONATE 50 MCG
100 SPRAY, SUSPENSION (ML) NASAL
COMMUNITY
Start: 2022-05-10 | End: 2022-11-17 | Stop reason: SDUPTHER

## 2022-11-17 RX ORDER — BUPROPION HYDROCHLORIDE 150 MG/1
150 TABLET ORAL DAILY
COMMUNITY
Start: 2022-10-18 | End: 2022-11-17 | Stop reason: SDUPTHER

## 2022-11-17 RX ORDER — AZELASTINE 1 MG/ML
1 SPRAY, METERED NASAL 2 TIMES DAILY
Qty: 30 ML | Refills: 3 | Status: SHIPPED | OUTPATIENT
Start: 2022-11-17 | End: 2023-11-17

## 2022-11-17 RX ORDER — SOD SULF/POT CHLORIDE/MAG SULF 1.479 G
TABLET ORAL
COMMUNITY
Start: 2022-06-03 | End: 2022-11-17

## 2022-11-17 RX ORDER — ESZOPICLONE 2 MG/1
2 TABLET, FILM COATED ORAL
COMMUNITY
Start: 2022-06-06 | End: 2022-11-17 | Stop reason: SDUPTHER

## 2022-11-17 RX ORDER — DENOSUMAB 60 MG/ML
60 INJECTION SUBCUTANEOUS
COMMUNITY
Start: 2022-07-12

## 2022-11-17 NOTE — PROGRESS NOTES
[facility] Progress Note  11/17/2022          Reason for Visit:      had concerns including Establish Care.       History of Present Illness:     Pt is here today to establish primary care.     MDD  Pt on Wellbutrin  mg  Was on lexapro - but   Sees Gurdeep Bourgeois. Stable.      Hypothyroidism   Pt had recurrent nodules on the thyroid and had 2 surgeries and then finally had complete thyroidectomy.  Pt on Levothyroxine 200 mcg/d.   TSH 4.22 (slightly increasd) on 11/11/22, Free T4 was 1.08 (normal)     Palpitations  Not really bothering her anymore  Dad had MI in early 50s.  Previous holter with rare PAC, 1 PVC  Coronary calcium score was 0  Stress echo was normal - negative for ischemia    Insomnia   Lunesta prn 1-2/wk.  Sees Gurdeep Bourgeois     Allergic Rhinitis  Has had continued symptoms - is interested in trying something other than flonase- it causes her nose to burn.  Really only uses it sparingly , not often anyway.      IBS/Chronic Constipation   Resolved.  She walks her dog 2/day. She sometimes gets diarrhea now and tries to eat smaller meals since her Bowel Sx.   Previously on linzess. Pretty much resolved     Colon Ca -   Had partial colon resection by Dr. Young.   LN were all negative, did not require any additional treatment.          H/o Osteoporosis now Osteopenia -   Pt off Calcium and is on Vit D. Pt on Boniva 150 mg monthly.   Was forgetting to take the boniva monthly.  Now getting prolia q 6 months.  T score -2.2 on Last DEXA 7/22/21    HM  MMG - 7/25/22      Problem List:     Patient Active Problem List   Diagnosis    IBS (irritable bowel syndrome)    Postoperative hypothyroidism    Gastroesophageal reflux disease    Seasonal allergies    Hematuria    Depression    Insomnia    History of osteoporosis -- DXA 03/2016 was normal    History of UTI    Dysuria    Vaginal atrophy    Chronic constipation    Myalgia of pelvic floor    Polyp of colon    Malignant neoplasm of colon  "   Iron deficiency anemia    Preop examination    Screening mammogram, encounter for    Seasonal allergic rhinitis due to pollen    Dizziness         Medications:       Current Outpatient Medications:     eszopiclone (LUNESTA) 2 MG Tab, Take 1 tablet (2 mg total) by mouth nightly as needed., Disp: 90 tablet, Rfl: 1    fluticasone propionate (FLONASE) 50 mcg/actuation nasal spray, 2 sprays (100 mcg total) by Each Nostril route once daily., Disp: 16 g, Rfl: 5    levothyroxine (SYNTHROID) 200 MCG tablet, TAKE ONE TABLET BY MOUTH EVERY DAY BEFORE BREAKFAST, Disp: 90 tablet, Rfl: 3    PROLIA 60 mg/mL Syrg, , Disp: , Rfl:     triamcinolone acetonide 0.1% (KENALOG) 0.1 % cream, , Disp: , Rfl:     azelastine (ASTELIN) 137 mcg (0.1 %) nasal spray, 1 spray (137 mcg total) by Nasal route 2 (two) times daily., Disp: 30 mL, Rfl: 3    buPROPion (WELLBUTRIN XL) 150 MG TB24 tablet, Take 1 tablet (150 mg total) by mouth once daily., Disp: 90 tablet, Rfl: 3        Review of Systems:     Review of Systems   Constitutional:  Negative for chills and fever.   HENT:  Negative for ear pain and sore throat.    Respiratory:  Negative for cough, shortness of breath and wheezing.    Cardiovascular:  Negative for chest pain and palpitations.   Gastrointestinal:  Negative for constipation, diarrhea, nausea and vomiting.   Genitourinary:  Negative for dysuria and hematuria.   Musculoskeletal:  Negative for joint swelling and joint deformity.   Neurological:  Negative for dizziness and weakness.         Physical Exam:     Temp:    98.5 °F (36.9 °C) (Oral)        Blood Pressure:  112/64        Pulse:   64     Respirations:  18  Weight:   63 kg (139 lb)  Height:   5' 6" (1.676 m)  BMI:     Body mass index is 22.44 kg/m².    Physical Exam  Constitutional:       General: She is not in acute distress.  HENT:      Right Ear: Tympanic membrane normal.      Left Ear: Tympanic membrane normal.      Nose: No congestion or rhinorrhea.      Mouth/Throat:      " Pharynx: No oropharyngeal exudate or posterior oropharyngeal erythema.   Cardiovascular:      Rate and Rhythm: Normal rate and regular rhythm.   Pulmonary:      Effort: Pulmonary effort is normal. No respiratory distress.      Breath sounds: No wheezing.   Abdominal:      Palpations: Abdomen is soft.      Tenderness: There is no abdominal tenderness.   Skin:     General: Skin is warm.   Neurological:      General: No focal deficit present.      Mental Status: She is alert and oriented to person, place, and time.         Labs/Imaging/Testing:     Most recent CBC:  Lab Results   Component Value Date    WBC 4.91 11/11/2022    HGB 13.9 11/11/2022     11/11/2022    MCV 96 11/11/2022       Most recent Lipids:  Lab Results   Component Value Date    CHOL 190 10/12/2021    HDL 69 10/12/2021    LDLCALC 112.8 10/12/2021    TRIG 41 10/12/2021       Most recent Chemistry:  Lab Results   Component Value Date     11/11/2022    K 4.5 11/11/2022     11/11/2022    CO2 25 11/11/2022    BUN 16 11/11/2022    CREATININE 0.9 11/11/2022    GLU 82 11/11/2022    CALCIUM 9.7 11/11/2022    ALT 15 11/11/2022    AST 18 11/11/2022    ALKPHOS 47 (L) 11/11/2022    PROT 6.7 11/11/2022    ALBUMIN 4.0 11/11/2022       Other tests:  Lab Results   Component Value Date    TSH 4.220 (H) 11/11/2022    FREET4 1.08 11/11/2022    EAAVIPQX64 422 03/09/2017    [id number] 44 03/09/2017    MG 1.8 03/09/2017             Assessment and Plan:     1. MDD (major depressive disorder), recurrent episode, mild  Stable, continue wellbutrin  - buPROPion (WELLBUTRIN XL) 150 MG TB24 tablet; Take 1 tablet (150 mg total) by mouth once daily.  Dispense: 90 tablet; Refill: 3    3. Postoperative hypothyroidism  Tsh slightly up, no symptoms of hypothyroidism.  T4 normal.  Discussed continuing levothyroxine 200 mcg daily and repeat labs in 6 months.  If tsh increasing, could potentially slightly increase dose    4. Insomnia, unspecified type  Continue lunesta,  using sparingly    5. Seasonal allergies  Try adding astlin prn     6. Irritable bowel syndrome, unspecified type  Continue linzess.    7. Malignant neoplasm of colon, unspecified part of colon  S/p partial bowel resection, 1 year colonoscopy recently done, repeat 3 years    8. Senile purpura  Stable, monitor    RTC 6 mos or soner prn       Rashad Belcher MD  11/17/2022    This note was prepared using voice-recognition software.  Although efforts are made to proofread the note, some errors may persist in the final document.

## 2023-02-07 DIAGNOSIS — Z00.00 ENCOUNTER FOR MEDICARE ANNUAL WELLNESS EXAM: ICD-10-CM

## 2023-02-09 ENCOUNTER — TELEPHONE (OUTPATIENT)
Dept: PRIMARY CARE CLINIC | Facility: CLINIC | Age: 73
End: 2023-02-09
Payer: MEDICARE

## 2023-02-09 DIAGNOSIS — Z00.00 ENCOUNTER FOR MEDICARE ANNUAL WELLNESS EXAM: ICD-10-CM

## 2023-02-09 NOTE — TELEPHONE ENCOUNTER
Spoke with pt, made her aware the AWV is for medicare and is required by a NP. Pt aware an expresses understanding.

## 2023-02-09 NOTE — TELEPHONE ENCOUNTER
----- Message from Corinne Goodwin sent at 2/9/2023  4:47 PM CST -----  Type:  Patient Call    Who Called:PT   Would the patient rather a call back or a response via [facility]? Call   Best Call Back Number:[phone]  Additional Information: Pt  is calling  regarding order that's in for a  wellness visit, per pt  is  asking if this  visit needs to  be schedule.

## 2023-03-01 ENCOUNTER — TELEPHONE (OUTPATIENT)
Dept: PRIMARY CARE CLINIC | Facility: CLINIC | Age: 73
End: 2023-03-01
Payer: MEDICARE

## 2023-03-01 DIAGNOSIS — R42 DIZZINESS: Primary | ICD-10-CM

## 2023-03-01 PROBLEM — F33.0 MDD (MAJOR DEPRESSIVE DISORDER), RECURRENT EPISODE, MILD: Status: ACTIVE | Noted: 2023-03-01

## 2023-03-01 NOTE — PROGRESS NOTES
[facility] Progress Note  3/2/2023          Reason for Visit:      had concerns including Dizziness and Nausea.       History of Present Illness:     Pt recently went on a cruise.  The scopolamine patch she was using for motion sickness fell off.  She applied a 2nd one.  She began having confusion, hallucinations.    She was convinced her dog was on the cruise when he was not, and was wandering the halls of the cruise ship looking for the dog.  She had also taken lunesta that night.  She didn't take any more scopolamine or lunesta the rest of the cruise, and the hallcuinations improved but she has felt dizzy, nauseated ever since then.  No dry mouth any more.  She never had any difficulty urinating.   She says it almost feels like vertigo now.     MDD  Pt on Wellbutrin  mg  Sees Gurdeep Bourgeois. Stable.      Colon Ca -   Had partial colon resection by Dr. Young.   LN were all negative, did not require any additional treatment.       Senile purpura  Occasional bruising.    Hypothyroidism   Pt had recurrent nodules on the thyroid and had 2 surgeries and then finally had complete thyroidectomy.  Pt on Levothyroxine 200 mcg/d.   TSH 4.22 (slightly increasd) on 11/11/22, Free T4 was 1.08 (normal)      Palpitations  Not really bothering her anymore  Dad had MI in early 50s.  Previous holter with rare PAC, 1 PVC  Coronary calcium score was 0  Stress echo was normal - negative for ischemia     Insomnia   Lunesta prn 1-2/wk.  Sees Gurdeep Bourgeois     Allergic Rhinitis  Has had continued symptoms - is interested in trying something other than flonase- it causes her nose to burn.  Really only uses it sparingly , not often anyway.      IBS/Chronic Constipation   Resolved.  She walks her dog 2/day. She sometimes gets diarrhea now and tries to eat smaller meals since her Bowel Sx.   Previously on linzess. Pretty much resolved         H/o Osteoporosis now Osteopenia -   Pt off Calcium and is on Vit D. Pt  on Boniva 150 mg monthly.   Was forgetting to take the boniva monthly.  Now getting prolia q 6 months.  T score -2.2 on Last DEXA 7/22/21     HM  MMG - 7/25/22         Problem List:     Patient Active Problem List   Diagnosis    IBS (irritable bowel syndrome)    Postoperative hypothyroidism    Gastroesophageal reflux disease    Seasonal allergies    Hematuria    Depression    Insomnia    History of osteoporosis -- DXA 03/2016 was normal    History of UTI    Dysuria    Vaginal atrophy    Chronic constipation    Myalgia of pelvic floor    Polyp of colon    Malignant neoplasm of colon    Iron deficiency anemia    Preop examination    Screening mammogram, encounter for    Seasonal allergic rhinitis due to pollen    Dizziness    Other nonthrombocytopenic purpura    MDD (major depressive disorder), recurrent episode, mild         Medications:       Current Outpatient Medications:     azelastine (ASTELIN) 137 mcg (0.1 %) nasal spray, 1 spray (137 mcg total) by Nasal route 2 (two) times daily., Disp: 30 mL, Rfl: 3    buPROPion (WELLBUTRIN XL) 150 MG TB24 tablet, Take 1 tablet (150 mg total) by mouth once daily., Disp: 90 tablet, Rfl: 3    eszopiclone (LUNESTA) 2 MG Tab, Take 1 tablet (2 mg total) by mouth nightly as needed., Disp: 90 tablet, Rfl: 1    fluticasone propionate (FLONASE) 50 mcg/actuation nasal spray, 2 sprays (100 mcg total) by Each Nostril route once daily. (Patient not taking: Reported on 3/2/2023), Disp: 16 g, Rfl: 5    levothyroxine (SYNTHROID) 200 MCG tablet, TAKE ONE TABLET BY MOUTH EVERY DAY BEFORE breakfast, Disp: 90 tablet, Rfl: 2    meclizine (ANTIVERT) 25 mg tablet, Take 1 tablet (25 mg total) by mouth 3 (three) times daily as needed for Dizziness., Disp: 30 tablet, Rfl: 2    ondansetron (ZOFRAN) 4 MG tablet, Take 1 tablet (4 mg total) by mouth every 8 (eight) hours as needed for Nausea., Disp: 30 tablet, Rfl: 2    PROLIA 60 mg/mL Syrg, , Disp: , Rfl:     triamcinolone acetonide 0.1% (KENALOG) 0.1 %  "cream, , Disp: , Rfl:         Review of Systems:     Review of Systems   Constitutional:  Positive for appetite change. Negative for chills and fever.   HENT:  Negative for ear pain and sore throat.    Respiratory:  Negative for cough, shortness of breath and wheezing.    Cardiovascular:  Negative for chest pain and palpitations.   Gastrointestinal:  Negative for constipation, diarrhea, nausea and vomiting.   Genitourinary:  Negative for dysuria and hematuria.   Musculoskeletal:  Negative for joint swelling and joint deformity.   Neurological:  Positive for dizziness, vertigo and light-headedness. Negative for tremors, syncope, facial asymmetry, speech difficulty, weakness, numbness and memory loss.         Physical Exam:     Temp:             Blood Pressure:  113/66        Pulse:   78     Respirations:  16  Weight:   61.5 kg (135 lb 9.3 oz)  Height:   5' 2" (1.575 m)  BMI:     Body mass index is 24.8 kg/m².    Physical Exam  Constitutional:       General: She is not in acute distress.  HENT:      Right Ear: Tympanic membrane normal.      Left Ear: Tympanic membrane normal.      Nose: No congestion or rhinorrhea.      Mouth/Throat:      Mouth: Mucous membranes are moist.      Pharynx: No oropharyngeal exudate or posterior oropharyngeal erythema.   Eyes:      Pupils: Pupils are equal, round, and reactive to light.      Comments: No nystagmus.   Cardiovascular:      Rate and Rhythm: Normal rate and regular rhythm.   Pulmonary:      Effort: Pulmonary effort is normal. No respiratory distress.      Breath sounds: No wheezing.   Abdominal:      Palpations: Abdomen is soft.      Tenderness: There is no abdominal tenderness.   Skin:     General: Skin is warm.   Neurological:      General: No focal deficit present.      Mental Status: She is alert and oriented to person, place, and time.      Cranial Nerves: No cranial nerve deficit.      Sensory: No sensory deficit.      Motor: No weakness.   Psychiatric:         Behavior: " Behavior normal.         Thought Content: Thought content normal.         Labs/Imaging/Testing:     Most recent CBC:  Lab Results   Component Value Date    WBC 4.91 11/11/2022    HGB 13.9 11/11/2022     11/11/2022    MCV 96 11/11/2022       Most recent Lipids:  Lab Results   Component Value Date    CHOL 190 10/12/2021    HDL 69 10/12/2021    LDLCALC 112.8 10/12/2021    TRIG 41 10/12/2021       Most recent Chemistry:  Lab Results   Component Value Date     11/11/2022    K 4.5 11/11/2022     11/11/2022    CO2 25 11/11/2022    BUN 16 11/11/2022    CREATININE 0.9 11/11/2022    GLU 82 11/11/2022    CALCIUM 9.7 11/11/2022    ALT 15 11/11/2022    AST 18 11/11/2022    ALKPHOS 47 (L) 11/11/2022    PROT 6.7 11/11/2022    ALBUMIN 4.0 11/11/2022       Other tests:  Lab Results   Component Value Date    TSH 4.220 (H) 11/11/2022    FREET4 1.08 11/11/2022    HILTWMYG65 422 03/09/2017    [id number] 44 03/09/2017    MG 1.8 03/09/2017             Assessment and Plan:     1. Anticholinergic crisis, accidental or unintentional, initial encounter  This seems to have resolved and I would suspect that the dizziness she is experiencing now is more related to the motion sickness.  Continue zofran, and I did send meclizine - discussed concern about its anticholinergic effects and advised I'd recommend holding off on taking any only if symptoms did not improve by this weekend.  Some of her dizziness is positional - we questioned whether BPPV could be a possibility as well - and we gave a sheet of exercises to try that might be helpful if that was the case.       2. MDD (major depressive disorder), recurrent episode, mild  Stable on current meds    3. Malignant neoplasm of colon, unspecified part of colon  S/p resection    4. Other nonthrombocytopenic purpura  Stable    5. Postoperative hypothyroidism  Continue current meds    6. Insomnia, unspecified type  Caution with lunesta-     7. Osteopenia, unspecified  location  Continue prolia.    RTC prn if no improvement or worsening of symptoms.       Rashad Belcher MD  3/2/2023    This note was prepared using voice-recognition software.  Although efforts are made to proofread the note, some errors may persist in the final document.

## 2023-03-02 ENCOUNTER — TELEPHONE (OUTPATIENT)
Dept: OTOLARYNGOLOGY | Facility: CLINIC | Age: 73
End: 2023-03-02
Payer: MEDICARE

## 2023-03-02 ENCOUNTER — OFFICE VISIT (OUTPATIENT)
Dept: PRIMARY CARE CLINIC | Facility: CLINIC | Age: 73
End: 2023-03-02
Payer: MEDICARE

## 2023-03-02 VITALS
SYSTOLIC BLOOD PRESSURE: 113 MMHG | DIASTOLIC BLOOD PRESSURE: 66 MMHG | HEIGHT: 62 IN | OXYGEN SATURATION: 98 % | BODY MASS INDEX: 24.95 KG/M2 | HEART RATE: 78 BPM | WEIGHT: 135.56 LBS | RESPIRATION RATE: 16 BRPM

## 2023-03-02 DIAGNOSIS — C18.9 MALIGNANT NEOPLASM OF COLON, UNSPECIFIED PART OF COLON: ICD-10-CM

## 2023-03-02 DIAGNOSIS — G47.00 INSOMNIA, UNSPECIFIED TYPE: ICD-10-CM

## 2023-03-02 DIAGNOSIS — M85.80 OSTEOPENIA, UNSPECIFIED LOCATION: ICD-10-CM

## 2023-03-02 DIAGNOSIS — F33.0 MDD (MAJOR DEPRESSIVE DISORDER), RECURRENT EPISODE, MILD: ICD-10-CM

## 2023-03-02 DIAGNOSIS — D69.2 OTHER NONTHROMBOCYTOPENIC PURPURA: ICD-10-CM

## 2023-03-02 DIAGNOSIS — T44.3X1A: Primary | ICD-10-CM

## 2023-03-02 DIAGNOSIS — E89.0 POSTOPERATIVE HYPOTHYROIDISM: ICD-10-CM

## 2023-03-02 PROCEDURE — 1126F AMNT PAIN NOTED NONE PRSNT: CPT | Mod: HCNC,CPTII,S$GLB, | Performed by: INTERNAL MEDICINE

## 2023-03-02 PROCEDURE — 1101F PT FALLS ASSESS-DOCD LE1/YR: CPT | Mod: HCNC,CPTII,S$GLB, | Performed by: INTERNAL MEDICINE

## 2023-03-02 PROCEDURE — 3078F DIAST BP <80 MM HG: CPT | Mod: HCNC,CPTII,S$GLB, | Performed by: INTERNAL MEDICINE

## 2023-03-02 PROCEDURE — 1159F PR MEDICATION LIST DOCUMENTED IN MEDICAL RECORD: ICD-10-PCS | Mod: HCNC,CPTII,S$GLB, | Performed by: INTERNAL MEDICINE

## 2023-03-02 PROCEDURE — 3288F FALL RISK ASSESSMENT DOCD: CPT | Mod: HCNC,CPTII,S$GLB, | Performed by: INTERNAL MEDICINE

## 2023-03-02 PROCEDURE — 1159F MED LIST DOCD IN RCRD: CPT | Mod: HCNC,CPTII,S$GLB, | Performed by: INTERNAL MEDICINE

## 2023-03-02 PROCEDURE — 99999 PR PBB SHADOW E&M-EST. PATIENT-LVL IV: CPT | Mod: PBBFAC,HCNC,, | Performed by: INTERNAL MEDICINE

## 2023-03-02 PROCEDURE — 1126F PR PAIN SEVERITY QUANTIFIED, NO PAIN PRESENT: ICD-10-PCS | Mod: HCNC,CPTII,S$GLB, | Performed by: INTERNAL MEDICINE

## 2023-03-02 PROCEDURE — 3074F SYST BP LT 130 MM HG: CPT | Mod: HCNC,CPTII,S$GLB, | Performed by: INTERNAL MEDICINE

## 2023-03-02 PROCEDURE — 3078F PR MOST RECENT DIASTOLIC BLOOD PRESSURE < 80 MM HG: ICD-10-PCS | Mod: HCNC,CPTII,S$GLB, | Performed by: INTERNAL MEDICINE

## 2023-03-02 PROCEDURE — 3074F PR MOST RECENT SYSTOLIC BLOOD PRESSURE < 130 MM HG: ICD-10-PCS | Mod: HCNC,CPTII,S$GLB, | Performed by: INTERNAL MEDICINE

## 2023-03-02 PROCEDURE — 3288F PR FALLS RISK ASSESSMENT DOCUMENTED: ICD-10-PCS | Mod: HCNC,CPTII,S$GLB, | Performed by: INTERNAL MEDICINE

## 2023-03-02 PROCEDURE — 99999 PR PBB SHADOW E&M-EST. PATIENT-LVL IV: ICD-10-PCS | Mod: PBBFAC,HCNC,, | Performed by: INTERNAL MEDICINE

## 2023-03-02 PROCEDURE — 1101F PR PT FALLS ASSESS DOC 0-1 FALLS W/OUT INJ PAST YR: ICD-10-PCS | Mod: HCNC,CPTII,S$GLB, | Performed by: INTERNAL MEDICINE

## 2023-03-02 PROCEDURE — 3008F PR BODY MASS INDEX (BMI) DOCUMENTED: ICD-10-PCS | Mod: HCNC,CPTII,S$GLB, | Performed by: INTERNAL MEDICINE

## 2023-03-02 PROCEDURE — 99214 PR OFFICE/OUTPT VISIT, EST, LEVL IV, 30-39 MIN: ICD-10-PCS | Mod: HCNC,S$GLB,, | Performed by: INTERNAL MEDICINE

## 2023-03-02 PROCEDURE — 3008F BODY MASS INDEX DOCD: CPT | Mod: HCNC,CPTII,S$GLB, | Performed by: INTERNAL MEDICINE

## 2023-03-02 PROCEDURE — 99214 OFFICE O/P EST MOD 30 MIN: CPT | Mod: HCNC,S$GLB,, | Performed by: INTERNAL MEDICINE

## 2023-03-02 RX ORDER — MECLIZINE HYDROCHLORIDE 25 MG/1
25 TABLET ORAL 3 TIMES DAILY PRN
Qty: 30 TABLET | Refills: 2 | Status: SHIPPED | OUTPATIENT
Start: 2023-03-02 | End: 2023-09-19

## 2023-03-02 RX ORDER — ONDANSETRON 4 MG/1
4 TABLET, FILM COATED ORAL EVERY 8 HOURS PRN
Qty: 30 TABLET | Refills: 2 | Status: SHIPPED | OUTPATIENT
Start: 2023-03-02 | End: 2023-09-19

## 2023-03-02 NOTE — PATIENT INSTRUCTIONS
Vestibular Exercises   About this topic   A condition called benign paroxysmal positional vertigo, or BPPV, can cause dizziness. Inside of your inner ear, you have fluid. Small crystals float in the fluid. With this problem, the crystals get stuck in the inner ear. When you move your head, you may have more signs. Sometimes, special exercises can help move the crystals and make this problem better. Always ask your doctor before you do any exercises. Your doctor or a physical therapist can help you find the best exercises for your problem.  General   Before starting with a program, ask your doctor if you are healthy enough to do these exercises. Your doctor may have you work with a  or physical therapist to make a safe exercise program to meet your needs. Some of these exercises could make your problems worse at first. Ask your doctor or physical therapist how often and how long you should do these exercises. These are often done a few times each day until 2 days after the dizziness has ended.  Epley Maneuver   This is mostly used when there is a diagnosis of a particular side having a problem.  If your right side has a problem:  Have a pillow towards the end of the bed where your feet most often are. Sit with your left side closest to the side of the bed. Sit far enough away from the pillow that it will end up being under your shoulders when you lie back. Tilt your head slightly back while doing this exercise. Be sure to hold each position for 30 seconds before moving to the next position.  Start sitting up with your legs extended in front of you. Turn your head care home between looking straight forward and looking over your right shoulder. This is a 45 degree angle. Hold this position.  Lie back quickly with your head in the same position. Turn your head care home to the right and tilted back a little bit. Hold this position.  Now roll your head to the left. Your head should now be tilted back a little bit.  Turn your head to the left, looking residential between straight ahead and over your left shoulder. This is a 45 degree angle. Hold this position.  Now, roll onto your left shoulder. Point your head down to the left at a 45 degree angle. Hold this position.  Finish by sitting up on the side of the bed. Do this 3 times throughout the day.  If your left side has a problem:  Have a pillow towards the end of the bed where your feet most often are. Sit with your right side closest to the side of the bed. Sit far enough away from the pillow that it will end up being under your shoulders when you lie back. Tilt your head slightly back while doing this exercise. Be sure to hold each position for 30 seconds before moving to the next position.  Start sitting up with your legs extended in front of you. Turn your head residential between looking straight forward and looking over your left shoulder. This is a 45 degree angle. Hold this position.  Lie back quickly with your head in the same position. Turn your head residential to the left and tilted back a little bit. Hold this position.  Now, roll your head to the right. Tilt your head back a little bit. Turn your head to the right looking residential between straight ahead and over your right shoulder. This is a 45 degree angle. Hold this position.  Now, roll onto your right shoulder. Point your head down to the right at a 45 degree angle. Hold this position.  Finish by sitting up on the side of the bed. Do this 3 times throughout the day.  Mahoney-Daroff Exercises   This is mostly used when there is not a diagnosis of a particular side having a problem.  Sit at the edge of the bed in the middle of one side.  Now, lie down on your right side with your head angled upwards 45 degrees. This is looking residential between straight ahead and over your right shoulder. Hold this position for 30 seconds or until the dizziness ends.  Sit back up with your head straight and hold that position for 30  seconds.  Now, lie down on your left side with your head angled upwards 45 degrees. This is looking alf between straight ahead and over your left shoulder. Hold this position for 30 seconds or until the dizziness ends.  Sit back up with your head straight and hold that position for 30 seconds. Repeat these steps 5 times in a row, 3 times each day.     What problems could happen?   Your dizziness does not go away or gets worse  You have a different kind of dizziness  You have more nausea  Sudden change in hearing  New onset of ear pain  New onset of ringing in the ears  Fluid discharge from the ears  Where can I learn more?   [employer]  http://www.brainandspine.org.uk/bxxwynsldm-pnwwzjvqqnhgef-mlhjsujxf   Last Reviewed Date   2018-03-27  Consumer Information Use and Disclaimer   This information is not specific medical advice and does not replace information you receive from your health care provider. This is only a brief summary of general information. It does NOT include all information about conditions, illnesses, injuries, tests, procedures, treatments, therapies, discharge instructions or life-style choices that may apply to you. You must talk with your health care provider for complete information about your health and treatment options. This information should not be used to decide whether or not to accept your health care providers advice, instructions or recommendations. Only your health care provider has the knowledge and training to provide advice that is right for you.  Copyright   Copyright © 2021 [employer] and its affiliates and/or licensors. All rights reserved.

## 2023-03-03 ENCOUNTER — PATIENT MESSAGE (OUTPATIENT)
Dept: OTOLARYNGOLOGY | Facility: CLINIC | Age: 73
End: 2023-03-03
Payer: MEDICARE

## 2023-03-15 ENCOUNTER — PATIENT MESSAGE (OUTPATIENT)
Dept: PRIMARY CARE CLINIC | Facility: CLINIC | Age: 73
End: 2023-03-15
Payer: MEDICARE

## 2023-06-15 ENCOUNTER — PES CALL (OUTPATIENT)
Dept: ADMINISTRATIVE | Facility: CLINIC | Age: 73
End: 2023-06-15
Payer: MEDICARE

## 2023-07-24 ENCOUNTER — PATIENT MESSAGE (OUTPATIENT)
Dept: PRIMARY CARE CLINIC | Facility: CLINIC | Age: 73
End: 2023-07-24
Payer: MEDICARE

## 2023-09-12 ENCOUNTER — TELEPHONE (OUTPATIENT)
Dept: PRIMARY CARE CLINIC | Facility: CLINIC | Age: 73
End: 2023-09-12
Payer: MEDICARE

## 2023-09-12 ENCOUNTER — PATIENT MESSAGE (OUTPATIENT)
Dept: PRIMARY CARE CLINIC | Facility: CLINIC | Age: 73
End: 2023-09-12
Payer: MEDICARE

## 2023-09-12 DIAGNOSIS — E89.0 POSTOPERATIVE HYPOTHYROIDISM: ICD-10-CM

## 2023-09-12 RX ORDER — LEVOTHYROXINE SODIUM 200 UG/1
200 TABLET ORAL DAILY
Qty: 90 TABLET | Refills: 2 | Status: SHIPPED | OUTPATIENT
Start: 2023-09-12 | End: 2023-09-19 | Stop reason: SDUPTHER

## 2023-09-12 NOTE — TELEPHONE ENCOUNTER
----- Message from Vicki King MA sent at 9/12/2023 12:22 PM CDT -----  Regarding: RX Refill or New Prescription  Contact: [phone]  Please see message regarding refill request  ----- Message -----  From: Fatoumata Rice  Sent: 9/12/2023  12:17 PM CDT  To: [facility] Staff    Requesting an RX refill or new RX.  Is this a refill or new RX:   RX name and strength (copy/paste from chart):  levothyroxine (SYNTHROID) 200 MCG tablet  Is this a 30 day or 90 day RX:   Pharmacy name and phone # (copy/paste from chart):      [facility] - [address] [facility] [email or web address] [facility] [address]  [address] [facility] [address]  [place] [address]  Phone: [phone] Fax: [phone]

## 2023-09-17 NOTE — PROGRESS NOTES
[facility] Progress Note  9/19/2023          Reason for Visit:      had concerns including Annual Exam.       History of Present Illness:     Pt is here today for a check up       Palpitations  She has recently been having some brief palpitations/flutters and is interested in seeing cardiology again.  She had a cardiac evaluation about 10 years ago - stress test was abnormal, but coronary calcium score was 0  Dad had MI in early 50s.  Previous holter with rare PAC, 1 PVC  Coronary calcium score was 0    MDD  Pt on Wellbutrin  mg  Sees Gurdeep Bourgeois. Stable.      Colon Ca -   Had partial colon resection by Dr. Young.   LN were all negative, did not require any additional treatment.        Senile purpura  Occasional bruising.     Hypothyroidism   Pt had recurrent nodules on the thyroid and had 2 surgeries and then finally had complete thyroidectomy.  Pt on Levothyroxine 200 mcg/d.   TSH 4.22 (slightly increasd) on 11/11/22, Free T4 was 1.08 (normal)       Insomnia   Lunesta prn 1-2/wk.  Sees Gurdeep Bourgeois     Allergic Rhinitis  Has been doing ok recently     IBS/Chronic Constipation   Resolved.  She walks her dog 2/day. She sometimes gets diarrhea now and tries to eat smaller meals since her Bowel Sx.   Previously on linzess. Pretty much resolved          H/o Osteoporosis now Osteopenia -   Pt off Calcium and is on Vit D. Pt on Boniva 150 mg monthly.   Was forgetting to take the boniva monthly.  Now getting prolia q 6 months.  T score -2.2 on Last DEXA 7/22/21, scheduled this Thursday for DEXA     HM  MMG - 7/25/22, scheduled this Thursday for DEXA also  [facility] it at [facility]  Encouraged covid vaccine at pharmacy  Flu shot today      Problem List:     Patient Active Problem List   Diagnosis    IBS (irritable bowel syndrome)    Postoperative hypothyroidism    Gastroesophageal reflux disease    Seasonal allergies    Hematuria    Depression    Insomnia    History of osteoporosis -- DXA 03/2016 was  normal    History of UTI    Dysuria    Vaginal atrophy    Chronic constipation    Myalgia of pelvic floor    Polyp of colon    Malignant neoplasm of colon    Iron deficiency anemia    Preop examination    Screening mammogram, encounter for    Seasonal allergic rhinitis due to pollen    Dizziness    Other nonthrombocytopenic purpura    MDD (major depressive disorder), recurrent episode, mild         Medications:       Current Outpatient Medications:     azelastine (ASTELIN) 137 mcg (0.1 %) nasal spray, 1 spray (137 mcg total) by Nasal route 2 (two) times daily., Disp: 30 mL, Rfl: 3    hydrocortisone 2.5 % cream, Apply topically 2 (two) times daily as needed., Disp: , Rfl:     PROLIA 60 mg/mL Syrg, , Disp: , Rfl:     triamcinolone acetonide 0.1% (KENALOG) 0.1 % cream, , Disp: , Rfl:     buPROPion (WELLBUTRIN XL) 150 MG TB24 tablet, Take 1 tablet (150 mg total) by mouth once daily., Disp: 90 tablet, Rfl: 3    eszopiclone (LUNESTA) 2 MG Tab, Take 1 tablet (2 mg total) by mouth nightly as needed., Disp: 30 tablet, Rfl: 3    levothyroxine (SYNTHROID) 200 MCG tablet, Take 1 tablet (200 mcg total) by mouth once daily., Disp: 90 tablet, Rfl: 2        Review of Systems:     Review of Systems   Constitutional:  Negative for activity change and unexpected weight change.   HENT:  Negative for hearing loss, rhinorrhea and trouble swallowing.    Eyes:  Negative for discharge and visual disturbance.   Respiratory:  Positive for chest tightness. Negative for wheezing.    Cardiovascular:  Positive for palpitations. Negative for chest pain.   Gastrointestinal:  Positive for constipation. Negative for blood in stool, diarrhea and vomiting.   Endocrine: Negative for polydipsia and polyuria.   Genitourinary:  Negative for difficulty urinating, dysuria, hematuria and menstrual problem.   Musculoskeletal:  Negative for joint swelling and neck pain.   Neurological:  Positive for headaches. Negative for weakness.   Psychiatric/Behavioral:   "Negative for confusion and dysphoric mood.            Physical Exam:     Temp:             Blood Pressure:  120/68        Pulse:   73     Respirations:     Weight:   62.3 kg (137 lb 5.6 oz)  Height:   5' 2" (1.575 m)  BMI:     Body mass index is 25.12 kg/m².    Physical Exam      Labs/Imaging/Testing:     Most recent CBC:  Lab Results   Component Value Date    WBC 4.91 11/11/2022    HGB 13.9 11/11/2022     11/11/2022    MCV 96 11/11/2022       Most recent Lipids:  Lab Results   Component Value Date    CHOL 190 10/12/2021    HDL 69 10/12/2021    LDLCALC 112.8 10/12/2021    TRIG 41 10/12/2021       Most recent Chemistry:  Lab Results   Component Value Date     11/11/2022    K 4.5 11/11/2022     11/11/2022    CO2 25 11/11/2022    BUN 16 11/11/2022    CREATININE 0.9 11/11/2022    GLU 82 11/11/2022    CALCIUM 9.7 11/11/2022    ALT 15 11/11/2022    AST 18 11/11/2022    ALKPHOS 47 (L) 11/11/2022    PROT 6.7 11/11/2022    ALBUMIN 4.0 11/11/2022       Other tests:  Lab Results   Component Value Date    TSH 4.220 (H) 11/11/2022    FREET4 1.08 11/11/2022    PYEFITZA51 422 03/09/2017    [id number] 44 03/09/2017    MG 1.8 03/09/2017             Assessment and Plan:     1. Postoperative hypothyroidism  Recheck thyroid functions, continue synthroid  - TSH; Future  - T4, Free; Future  - levothyroxine (SYNTHROID) 200 MCG tablet; Take 1 tablet (200 mcg total) by mouth once daily.  Dispense: 90 tablet; Refill: 2    2. MDD (major depressive disorder), recurrent episode, mild  Stable on wellbutrin    3. Malignant neoplasm of colon, unspecified part of colon  Repeat colonoscopy in 2025    4. Insomnia, unspecified type  - eszopiclone (LUNESTA) 2 MG Tab; Take 1 tablet (2 mg total) by mouth nightly as needed.  Dispense: 30 tablet; Refill: 3    5. Seasonal allergic rhinitis due to pollen  Continue current meds    6. Irritable bowel syndrome with constipation  Does ok on linzess    7. Osteopenia, unspecified " location  [facility], repeat DEXA scheduled this thurs    8. Palpitations  - Ambulatory referral/consult to Cardiology; Future  - CBC Auto Differential; Future  - Comprehensive Metabolic Panel; Future  - Lipid Panel; Future  - TSH; Future  - T4, Free; Future    RTC 6 mos or sooner cielo Belcher MD  9/19/2023    This note was prepared using voice-recognition software.  Although efforts are made to proofread the note, some errors may persist in the final document.

## 2023-09-19 ENCOUNTER — OFFICE VISIT (OUTPATIENT)
Dept: PRIMARY CARE CLINIC | Facility: CLINIC | Age: 73
End: 2023-09-19
Payer: MEDICARE

## 2023-09-19 ENCOUNTER — PATIENT MESSAGE (OUTPATIENT)
Dept: PRIMARY CARE CLINIC | Facility: CLINIC | Age: 73
End: 2023-09-19

## 2023-09-19 DIAGNOSIS — E89.0 POSTOPERATIVE HYPOTHYROIDISM: Primary | ICD-10-CM

## 2023-09-19 DIAGNOSIS — K58.1 IRRITABLE BOWEL SYNDROME WITH CONSTIPATION: ICD-10-CM

## 2023-09-19 DIAGNOSIS — M85.80 OSTEOPENIA, UNSPECIFIED LOCATION: ICD-10-CM

## 2023-09-19 DIAGNOSIS — F32.A DEPRESSION, UNSPECIFIED DEPRESSION TYPE: ICD-10-CM

## 2023-09-19 DIAGNOSIS — G47.00 INSOMNIA, UNSPECIFIED TYPE: ICD-10-CM

## 2023-09-19 DIAGNOSIS — R00.2 PALPITATIONS: ICD-10-CM

## 2023-09-19 DIAGNOSIS — C18.9 MALIGNANT NEOPLASM OF COLON, UNSPECIFIED PART OF COLON: ICD-10-CM

## 2023-09-19 DIAGNOSIS — J30.1 SEASONAL ALLERGIC RHINITIS DUE TO POLLEN: ICD-10-CM

## 2023-09-19 DIAGNOSIS — F33.0 MDD (MAJOR DEPRESSIVE DISORDER), RECURRENT EPISODE, MILD: ICD-10-CM

## 2023-09-19 PROCEDURE — 1101F PR PT FALLS ASSESS DOC 0-1 FALLS W/OUT INJ PAST YR: ICD-10-PCS | Mod: HCNC,CPTII,S$GLB, | Performed by: INTERNAL MEDICINE

## 2023-09-19 PROCEDURE — 90694 VACC AIIV4 NO PRSRV 0.5ML IM: CPT | Mod: HCNC,S$GLB,, | Performed by: INTERNAL MEDICINE

## 2023-09-19 PROCEDURE — G0008 ADMIN INFLUENZA VIRUS VAC: HCPCS | Mod: HCNC,S$GLB,, | Performed by: INTERNAL MEDICINE

## 2023-09-19 PROCEDURE — 3078F DIAST BP <80 MM HG: CPT | Mod: HCNC,CPTII,S$GLB, | Performed by: INTERNAL MEDICINE

## 2023-09-19 PROCEDURE — 99999 PR PBB SHADOW E&M-EST. PATIENT-LVL IV: ICD-10-PCS | Mod: PBBFAC,HCNC,, | Performed by: INTERNAL MEDICINE

## 2023-09-19 PROCEDURE — 99214 OFFICE O/P EST MOD 30 MIN: CPT | Mod: HCNC,S$GLB,, | Performed by: INTERNAL MEDICINE

## 2023-09-19 PROCEDURE — 3288F FALL RISK ASSESSMENT DOCD: CPT | Mod: HCNC,CPTII,S$GLB, | Performed by: INTERNAL MEDICINE

## 2023-09-19 PROCEDURE — 3288F PR FALLS RISK ASSESSMENT DOCUMENTED: ICD-10-PCS | Mod: HCNC,CPTII,S$GLB, | Performed by: INTERNAL MEDICINE

## 2023-09-19 PROCEDURE — G0008 FLU VACCINE - QUADRIVALENT - ADJUVANTED: ICD-10-PCS | Mod: HCNC,S$GLB,, | Performed by: INTERNAL MEDICINE

## 2023-09-19 PROCEDURE — 1126F AMNT PAIN NOTED NONE PRSNT: CPT | Mod: HCNC,CPTII,S$GLB, | Performed by: INTERNAL MEDICINE

## 2023-09-19 PROCEDURE — 1101F PT FALLS ASSESS-DOCD LE1/YR: CPT | Mod: HCNC,CPTII,S$GLB, | Performed by: INTERNAL MEDICINE

## 2023-09-19 PROCEDURE — 1126F PR PAIN SEVERITY QUANTIFIED, NO PAIN PRESENT: ICD-10-PCS | Mod: HCNC,CPTII,S$GLB, | Performed by: INTERNAL MEDICINE

## 2023-09-19 PROCEDURE — 99214 PR OFFICE/OUTPT VISIT, EST, LEVL IV, 30-39 MIN: ICD-10-PCS | Mod: HCNC,S$GLB,, | Performed by: INTERNAL MEDICINE

## 2023-09-19 PROCEDURE — 3074F SYST BP LT 130 MM HG: CPT | Mod: HCNC,CPTII,S$GLB, | Performed by: INTERNAL MEDICINE

## 2023-09-19 PROCEDURE — 3008F PR BODY MASS INDEX (BMI) DOCUMENTED: ICD-10-PCS | Mod: HCNC,CPTII,S$GLB, | Performed by: INTERNAL MEDICINE

## 2023-09-19 PROCEDURE — 1159F PR MEDICATION LIST DOCUMENTED IN MEDICAL RECORD: ICD-10-PCS | Mod: HCNC,CPTII,S$GLB, | Performed by: INTERNAL MEDICINE

## 2023-09-19 PROCEDURE — 1159F MED LIST DOCD IN RCRD: CPT | Mod: HCNC,CPTII,S$GLB, | Performed by: INTERNAL MEDICINE

## 2023-09-19 PROCEDURE — 3074F PR MOST RECENT SYSTOLIC BLOOD PRESSURE < 130 MM HG: ICD-10-PCS | Mod: HCNC,CPTII,S$GLB, | Performed by: INTERNAL MEDICINE

## 2023-09-19 PROCEDURE — 3078F PR MOST RECENT DIASTOLIC BLOOD PRESSURE < 80 MM HG: ICD-10-PCS | Mod: HCNC,CPTII,S$GLB, | Performed by: INTERNAL MEDICINE

## 2023-09-19 PROCEDURE — 3008F BODY MASS INDEX DOCD: CPT | Mod: HCNC,CPTII,S$GLB, | Performed by: INTERNAL MEDICINE

## 2023-09-19 PROCEDURE — 90694 FLU VACCINE - QUADRIVALENT - ADJUVANTED: ICD-10-PCS | Mod: HCNC,S$GLB,, | Performed by: INTERNAL MEDICINE

## 2023-09-19 PROCEDURE — 99999 PR PBB SHADOW E&M-EST. PATIENT-LVL IV: CPT | Mod: PBBFAC,HCNC,, | Performed by: INTERNAL MEDICINE

## 2023-09-19 RX ORDER — HYDROCORTISONE 25 MG/G
CREAM TOPICAL 2 TIMES DAILY PRN
COMMUNITY
Start: 2023-04-19

## 2023-09-19 RX ORDER — LEVOTHYROXINE SODIUM 200 UG/1
200 TABLET ORAL DAILY
Qty: 90 TABLET | Refills: 2 | Status: SHIPPED | OUTPATIENT
Start: 2023-09-19

## 2023-09-19 RX ORDER — ESZOPICLONE 2 MG/1
2 TABLET, FILM COATED ORAL NIGHTLY PRN
Qty: 90 TABLET | Refills: 1 | Status: SHIPPED | OUTPATIENT
Start: 2023-09-19 | End: 2023-09-19 | Stop reason: SDUPTHER

## 2023-09-19 RX ORDER — ESZOPICLONE 2 MG/1
2 TABLET, FILM COATED ORAL NIGHTLY PRN
Qty: 30 TABLET | Refills: 3 | Status: SHIPPED | OUTPATIENT
Start: 2023-09-19

## 2023-09-19 RX ORDER — BUPROPION HYDROCHLORIDE 150 MG/1
150 TABLET ORAL DAILY
Qty: 90 TABLET | Refills: 3 | Status: SHIPPED | OUTPATIENT
Start: 2023-09-19

## 2023-09-20 VITALS
SYSTOLIC BLOOD PRESSURE: 120 MMHG | WEIGHT: 137.38 LBS | DIASTOLIC BLOOD PRESSURE: 68 MMHG | BODY MASS INDEX: 22.08 KG/M2 | HEIGHT: 66 IN | HEART RATE: 73 BPM | OXYGEN SATURATION: 97 %

## 2023-09-21 LAB
BCS RECOMMENDATION EXT: NORMAL
BMD RECOMMENDATION EXT: NORMAL

## 2023-09-25 ENCOUNTER — PATIENT MESSAGE (OUTPATIENT)
Dept: PRIMARY CARE CLINIC | Facility: CLINIC | Age: 73
End: 2023-09-25
Payer: MEDICARE

## 2023-09-25 ENCOUNTER — LAB VISIT (OUTPATIENT)
Dept: LAB | Facility: HOSPITAL | Age: 73
End: 2023-09-25
Attending: INTERNAL MEDICINE
Payer: MEDICARE

## 2023-09-25 DIAGNOSIS — C18.9 MALIGNANT NEOPLASM OF COLON, UNSPECIFIED PART OF COLON: ICD-10-CM

## 2023-09-25 DIAGNOSIS — R00.2 PALPITATIONS: ICD-10-CM

## 2023-09-25 DIAGNOSIS — E89.0 POSTOPERATIVE HYPOTHYROIDISM: ICD-10-CM

## 2023-09-25 LAB
ALBUMIN SERPL BCP-MCNC: 3.9 G/DL (ref 3.5–5.2)
ALP SERPL-CCNC: 46 U/L (ref 55–135)
ALT SERPL W/O P-5'-P-CCNC: 11 U/L (ref 10–44)
ANION GAP SERPL CALC-SCNC: 8 MMOL/L (ref 8–16)
AST SERPL-CCNC: 19 U/L (ref 10–40)
BASOPHILS # BLD AUTO: 0.05 K/UL (ref 0–0.2)
BASOPHILS NFR BLD: 1 % (ref 0–1.9)
BILIRUB SERPL-MCNC: 0.4 MG/DL (ref 0.1–1)
BUN SERPL-MCNC: 13 MG/DL (ref 8–23)
CALCIUM SERPL-MCNC: 8.7 MG/DL (ref 8.7–10.5)
CHLORIDE SERPL-SCNC: 110 MMOL/L (ref 95–110)
CHOLEST SERPL-MCNC: 215 MG/DL (ref 120–199)
CHOLEST/HDLC SERPL: 3.4 {RATIO} (ref 2–5)
CO2 SERPL-SCNC: 24 MMOL/L (ref 23–29)
CREAT SERPL-MCNC: 0.8 MG/DL (ref 0.5–1.4)
DIFFERENTIAL METHOD: ABNORMAL
EOSINOPHIL # BLD AUTO: 0.1 K/UL (ref 0–0.5)
EOSINOPHIL NFR BLD: 1.2 % (ref 0–8)
ERYTHROCYTE [DISTWIDTH] IN BLOOD BY AUTOMATED COUNT: 13.3 % (ref 11.5–14.5)
EST. GFR  (NO RACE VARIABLE): >60 ML/MIN/1.73 M^2
GLUCOSE SERPL-MCNC: 86 MG/DL (ref 70–110)
HCT VFR BLD AUTO: 41.4 % (ref 37–48.5)
HDLC SERPL-MCNC: 63 MG/DL (ref 40–75)
HDLC SERPL: 29.3 % (ref 20–50)
HGB BLD-MCNC: 13.8 G/DL (ref 12–16)
IMM GRANULOCYTES # BLD AUTO: 0.01 K/UL (ref 0–0.04)
IMM GRANULOCYTES NFR BLD AUTO: 0.2 % (ref 0–0.5)
LDLC SERPL CALC-MCNC: 131.8 MG/DL (ref 63–159)
LYMPHOCYTES # BLD AUTO: 1.9 K/UL (ref 1–4.8)
LYMPHOCYTES NFR BLD: 37.6 % (ref 18–48)
MCH RBC QN AUTO: 31.1 PG (ref 27–31)
MCHC RBC AUTO-ENTMCNC: 33.3 G/DL (ref 32–36)
MCV RBC AUTO: 93 FL (ref 82–98)
MONOCYTES # BLD AUTO: 0.5 K/UL (ref 0.3–1)
MONOCYTES NFR BLD: 9.6 % (ref 4–15)
NEUTROPHILS # BLD AUTO: 2.5 K/UL (ref 1.8–7.7)
NEUTROPHILS NFR BLD: 50.4 % (ref 38–73)
NONHDLC SERPL-MCNC: 152 MG/DL
NRBC BLD-RTO: 0 /100 WBC
PLATELET # BLD AUTO: 255 K/UL (ref 150–450)
PMV BLD AUTO: 10.5 FL (ref 9.2–12.9)
POTASSIUM SERPL-SCNC: 4.2 MMOL/L (ref 3.5–5.1)
PROT SERPL-MCNC: 6.4 G/DL (ref 6–8.4)
RBC # BLD AUTO: 4.44 M/UL (ref 4–5.4)
SODIUM SERPL-SCNC: 142 MMOL/L (ref 136–145)
T4 FREE SERPL-MCNC: 1.01 NG/DL (ref 0.71–1.51)
TRIGL SERPL-MCNC: 101 MG/DL (ref 30–150)
TSH SERPL DL<=0.005 MIU/L-ACNC: 2.66 UIU/ML (ref 0.4–4)
WBC # BLD AUTO: 4.92 K/UL (ref 3.9–12.7)

## 2023-09-25 PROCEDURE — 80053 COMPREHEN METABOLIC PANEL: CPT | Mod: HCNC | Performed by: INTERNAL MEDICINE

## 2023-09-25 PROCEDURE — 36415 COLL VENOUS BLD VENIPUNCTURE: CPT | Performed by: INTERNAL MEDICINE

## 2023-09-25 PROCEDURE — 84439 ASSAY OF FREE THYROXINE: CPT | Mod: HCNC | Performed by: INTERNAL MEDICINE

## 2023-09-25 PROCEDURE — 85025 COMPLETE CBC W/AUTO DIFF WBC: CPT | Mod: HCNC | Performed by: INTERNAL MEDICINE

## 2023-09-25 PROCEDURE — 80061 LIPID PANEL: CPT | Mod: HCNC | Performed by: INTERNAL MEDICINE

## 2023-09-25 PROCEDURE — 84443 ASSAY THYROID STIM HORMONE: CPT | Mod: HCNC | Performed by: INTERNAL MEDICINE

## 2023-09-26 RX ORDER — ROSUVASTATIN CALCIUM 10 MG/1
10 TABLET, COATED ORAL DAILY
Qty: 90 TABLET | Refills: 3 | Status: SHIPPED | OUTPATIENT
Start: 2023-09-26 | End: 2024-09-25

## 2023-09-29 ENCOUNTER — OFFICE VISIT (OUTPATIENT)
Dept: CARDIOLOGY | Facility: CLINIC | Age: 73
End: 2023-09-29
Payer: MEDICARE

## 2023-09-29 VITALS
WEIGHT: 141.13 LBS | BODY MASS INDEX: 22.68 KG/M2 | SYSTOLIC BLOOD PRESSURE: 112 MMHG | HEIGHT: 66 IN | DIASTOLIC BLOOD PRESSURE: 62 MMHG | HEART RATE: 64 BPM

## 2023-09-29 DIAGNOSIS — R00.2 PALPITATIONS: ICD-10-CM

## 2023-09-29 DIAGNOSIS — Z82.49 FAMILY HISTORY OF CAROTID ARTERY STENOSIS: Primary | ICD-10-CM

## 2023-09-29 DIAGNOSIS — Z13.6 ENCOUNTER FOR SCREENING FOR CARDIOVASCULAR DISORDERS: ICD-10-CM

## 2023-09-29 DIAGNOSIS — R09.89 OTHER SPECIFIED SYMPTOMS AND SIGNS INVOLVING THE CIRCULATORY AND RESPIRATORY SYSTEMS: ICD-10-CM

## 2023-09-29 PROCEDURE — 3288F FALL RISK ASSESSMENT DOCD: CPT | Mod: HCNC,CPTII,S$GLB, | Performed by: INTERNAL MEDICINE

## 2023-09-29 PROCEDURE — 93005 EKG 12-LEAD: ICD-10-PCS | Mod: HCNC,S$GLB,, | Performed by: INTERNAL MEDICINE

## 2023-09-29 PROCEDURE — 1126F AMNT PAIN NOTED NONE PRSNT: CPT | Mod: HCNC,CPTII,S$GLB, | Performed by: INTERNAL MEDICINE

## 2023-09-29 PROCEDURE — 3288F PR FALLS RISK ASSESSMENT DOCUMENTED: ICD-10-PCS | Mod: HCNC,CPTII,S$GLB, | Performed by: INTERNAL MEDICINE

## 2023-09-29 PROCEDURE — 93005 ELECTROCARDIOGRAM TRACING: CPT | Mod: HCNC,S$GLB,, | Performed by: INTERNAL MEDICINE

## 2023-09-29 PROCEDURE — 99999 PR PBB SHADOW E&M-EST. PATIENT-LVL V: ICD-10-PCS | Mod: PBBFAC,HCNC,, | Performed by: INTERNAL MEDICINE

## 2023-09-29 PROCEDURE — 3078F PR MOST RECENT DIASTOLIC BLOOD PRESSURE < 80 MM HG: ICD-10-PCS | Mod: HCNC,CPTII,S$GLB, | Performed by: INTERNAL MEDICINE

## 2023-09-29 PROCEDURE — 3074F SYST BP LT 130 MM HG: CPT | Mod: HCNC,CPTII,S$GLB, | Performed by: INTERNAL MEDICINE

## 2023-09-29 PROCEDURE — 3008F PR BODY MASS INDEX (BMI) DOCUMENTED: ICD-10-PCS | Mod: HCNC,CPTII,S$GLB, | Performed by: INTERNAL MEDICINE

## 2023-09-29 PROCEDURE — 1101F PR PT FALLS ASSESS DOC 0-1 FALLS W/OUT INJ PAST YR: ICD-10-PCS | Mod: HCNC,CPTII,S$GLB, | Performed by: INTERNAL MEDICINE

## 2023-09-29 PROCEDURE — 1126F PR PAIN SEVERITY QUANTIFIED, NO PAIN PRESENT: ICD-10-PCS | Mod: HCNC,CPTII,S$GLB, | Performed by: INTERNAL MEDICINE

## 2023-09-29 PROCEDURE — 3008F BODY MASS INDEX DOCD: CPT | Mod: HCNC,CPTII,S$GLB, | Performed by: INTERNAL MEDICINE

## 2023-09-29 PROCEDURE — 3078F DIAST BP <80 MM HG: CPT | Mod: HCNC,CPTII,S$GLB, | Performed by: INTERNAL MEDICINE

## 2023-09-29 PROCEDURE — 1160F PR REVIEW ALL MEDS BY PRESCRIBER/CLIN PHARMACIST DOCUMENTED: ICD-10-PCS | Mod: HCNC,CPTII,S$GLB, | Performed by: INTERNAL MEDICINE

## 2023-09-29 PROCEDURE — 99204 PR OFFICE/OUTPT VISIT, NEW, LEVL IV, 45-59 MIN: ICD-10-PCS | Mod: HCNC,S$GLB,, | Performed by: INTERNAL MEDICINE

## 2023-09-29 PROCEDURE — 3074F PR MOST RECENT SYSTOLIC BLOOD PRESSURE < 130 MM HG: ICD-10-PCS | Mod: HCNC,CPTII,S$GLB, | Performed by: INTERNAL MEDICINE

## 2023-09-29 PROCEDURE — 1159F MED LIST DOCD IN RCRD: CPT | Mod: HCNC,CPTII,S$GLB, | Performed by: INTERNAL MEDICINE

## 2023-09-29 PROCEDURE — 1101F PT FALLS ASSESS-DOCD LE1/YR: CPT | Mod: HCNC,CPTII,S$GLB, | Performed by: INTERNAL MEDICINE

## 2023-09-29 PROCEDURE — 1159F PR MEDICATION LIST DOCUMENTED IN MEDICAL RECORD: ICD-10-PCS | Mod: HCNC,CPTII,S$GLB, | Performed by: INTERNAL MEDICINE

## 2023-09-29 PROCEDURE — 99204 OFFICE O/P NEW MOD 45 MIN: CPT | Mod: HCNC,S$GLB,, | Performed by: INTERNAL MEDICINE

## 2023-09-29 PROCEDURE — 93010 ELECTROCARDIOGRAM REPORT: CPT | Mod: HCNC,S$GLB,, | Performed by: INTERNAL MEDICINE

## 2023-09-29 PROCEDURE — 1160F RVW MEDS BY RX/DR IN RCRD: CPT | Mod: HCNC,CPTII,S$GLB, | Performed by: INTERNAL MEDICINE

## 2023-09-29 PROCEDURE — 99999 PR PBB SHADOW E&M-EST. PATIENT-LVL V: CPT | Mod: PBBFAC,HCNC,, | Performed by: INTERNAL MEDICINE

## 2023-09-29 PROCEDURE — 93010 EKG 12-LEAD: ICD-10-PCS | Mod: HCNC,S$GLB,, | Performed by: INTERNAL MEDICINE

## 2023-09-29 RX ORDER — FERROUS SULFATE, DRIED 160(50) MG
1 TABLET, EXTENDED RELEASE ORAL DAILY
COMMUNITY

## 2023-09-29 RX ORDER — MULTIVITAMIN
1 TABLET ORAL DAILY
COMMUNITY

## 2023-09-29 NOTE — PROGRESS NOTES
Subjective:     Chief Complaint:  Ana Lilia Glass is a 72 y.o. female referred by Rashad Belcher MD for evaluation of Palpitations.    Problem List and HPI:   Palpitations  Colon cancer  Family h/o CAD   - father  Family h/o carotid disease   - mother had amaurosis fugax and severe carotid disease    She reports chest pain and palpitations.  She describes the discomfort in the chest as sharp in the mid sternal region w/o radiation that occurs several times over a few minutes. The discomfort does does not occur w exertion.  She describes the palpitations as a fluttering. It is not accompanied by dizziness. She has dizziness when her head is extended.   A CACS in 2014 was 0 and I had advised against starting a statin. Total chol has been 190-224 and -146 mg/dl. Dr. Belcher prescribed rosuvastatin 10 mg a few days ago. She does not take aspirin or fish oil.      Review of patient's allergies indicates:   Allergen Reactions    Bactrim [sulfamethoxazole-trimethoprim] Other (See Comments)     Unknown    Codeine Itching and Rash    Sulfa (sulfonamide antibiotics) Other (See Comments)     Pt was 5yrs old        Current Outpatient Medications   Medication Sig    azelastine (ASTELIN) 137 mcg (0.1 %) nasal spray 1 spray (137 mcg total) by Nasal route 2 (two) times daily. (Patient taking differently: 1 spray by Nasal route as needed.)    buPROPion (WELLBUTRIN XL) 150 MG TB24 tablet Take 1 tablet (150 mg total) by mouth once daily.    calcium-vitamin D3 (CALCIUM 500 + D) 500 mg-5 mcg (200 unit) per tablet Take 1 tablet by mouth once daily.    eszopiclone (LUNESTA) 2 MG Tab Take 1 tablet (2 mg total) by mouth nightly as needed.    fexofenadine HCl (ALLEGRA ALLERGY ORAL) Take 1 tablet by mouth as needed.    hydrocortisone 2.5 % cream Apply topically 2 (two) times daily as needed.    levothyroxine (SYNTHROID) 200 MCG tablet Take 1 tablet (200 mcg total) by mouth once daily.    LUTEIN ORAL Take 1 tablet by mouth once  "daily.    multivitamin (ONE DAILY MULTIVITAMIN) per tablet Take 1 tablet by mouth once daily.    PROLIA 60 mg/mL Syrg Inject 60 mg into the skin every 6 (six) months.    rosuvastatin (CRESTOR) 10 MG tablet Take 1 tablet (10 mg total) by mouth once daily.    triamcinolone acetonide 0.1% (KENALOG) 0.1 % cream as needed.       Past Medical and Surgical history:  Ana Lilia Glass  has a past medical history of Colon polyps, Hiatal hernia, Internal hemorrhoids, Nonintractable episodic headache (3/8/2016), and Osteoporosis.   Past Surgical History:   Procedure Laterality Date    APPENDECTOMY  2004    COLONOSCOPY  2011    polyp, repeat 5 years    HEMORRHOID SURGERY  12/2016    HYSTERECTOMY  06/2016    partial Hys with BSO -due to menorrhagia    TONSILLECTOMY, ADENOIDECTOMY      TOTAL THYROIDECTOMY  1997       Social history:  Ana Lilia Glass  reports that she quit smoking about 26 years ago. Her smoking use included cigarettes. She started smoking about 44 years ago. She has a 27.0 pack-year smoking history. She has never used smokeless tobacco. She used to drink 2-4 glasses of wine a day but stopped after she retired. She does not currently use alcohol. She reports that she does not use drugs.    Family history:  Ana Lilia's family history includes Alcohol abuse in her brother and father; Atrial fibrillation in her cousin; COPD in her mother; Dementia in her maternal aunt and maternal grandmother; Heart attack (age of onset: 49) in her paternal uncle; Heart attack (age of onset: 56) in her father; Heart attack (age of onset: 59) in her cousin; Heart disease in her brother, father, mother, and paternal uncle; Hypertension in her father.      Objective:   /62   Pulse 64   Ht 5' 6" (1.676 m)   Wt 64 kg (141 lb 1.5 oz)   BMI 22.77 kg/m²    Physical Exam  Constitutional:       Appearance: She is well-developed.      Comments:      HENT:      Head: Normocephalic.   Neck:      Vascular: No carotid bruit or JVD. "   Cardiovascular:      Rate and Rhythm: Normal rate and regular rhythm.      Pulses:           Radial pulses are 2+ on the right side and 2+ on the left side.        Posterior tibial pulses are 2+ on the right side and 2+ on the left side.      Heart sounds: S1 normal and S2 normal. No murmur heard.     No gallop.   Pulmonary:      Effort: Pulmonary effort is normal.      Breath sounds: No wheezing or rales.   Chest:      Chest wall: There is no dullness to percussion.   Abdominal:      General: There is no abdominal bruit.      Palpations: Abdomen is soft. There is no hepatomegaly or splenomegaly.      Tenderness: There is no abdominal tenderness.   Musculoskeletal:      Right lower leg: No edema.      Left lower leg: No edema.   Skin:     General: Skin is warm and dry.      Findings: No bruising or rash.      Nails: There is no clubbing.   Neurological:      Mental Status: She is alert and oriented to person, place, and time.      Gait: Gait normal.   Psychiatric:         Speech: Speech normal.         Behavior: Behavior normal.         Judgment: Judgment normal.           Labs  Lipids:  Recent Labs   Lab 09/25/23  1103   LDL Cholesterol 131.8   HDL 63   Cholesterol 215 H      Renal:  Recent Labs   Lab 09/25/23  1103   Creatinine 0.8   Potassium 4.2   CO2 24   BUN 13     Liver:  Recent Labs   Lab 09/25/23  1103   AST 19   ALT 11     Cbc:    Lab Results   Component Value Date    WBC 4.92 09/25/2023    HGB 13.8 09/25/2023    HCT 41.4 09/25/2023    MCV 93 09/25/2023     09/25/2023       ECG sinus rhythm with QS in the septal leads consistent with a septal infarction.  This is unchanged from her previous ECG.    Focused echo at bedside without charge reveals  normal left ventricular size and function. Normal septal contractility.      Assessment and Plan:       ICD-10-CM ICD-9-CM   1. Family history of carotid artery stenosis  Z82.49 V17.49   2. Palpitations  R00.2 785.1   3. Other specified symptoms and signs  involving the circulatory and respiratory systems  R09.89 785.9     786.9   4. Encounter for screening for cardiovascular disorders  Z13.6 V81.2        LDL has worsened. Agree with initiation of statin therapy.  Coronary artery calcium determination by CT scan is also reasonable, although the results will likely not affect her management unless the score is high in which case the addition of fish oil and aspirin would be appropriate.  Carotid u/s in view of  dizziness and family h/o carotid artery disease.  Palpitations are short lived and not concerning. Chest discomfort is not cardiac.    Orders entered during this encounter:    Family history of carotid artery stenosis  -     US Carotid Bilateral; Future; Expected date: 09/29/2023  -     CT Calcium Scoring Cardiac; Future; Expected date: 09/29/2023  -     CV Ultrasound Bilateral Doppler Carotid; Future    Palpitations  -     Ambulatory referral/consult to Cardiology  -     IN OFFICE EKG 12-LEAD (to Muse)    Other specified symptoms and signs involving the circulatory and respiratory systems  -     US Carotid Bilateral; Future; Expected date: 09/29/2023  -     CV Ultrasound Bilateral Doppler Carotid; Future    Encounter for screening for cardiovascular disorders  -     CT Calcium Scoring Cardiac; Future; Expected date: 09/29/2023         No follow-ups on file.

## 2023-10-05 ENCOUNTER — HOSPITAL ENCOUNTER (OUTPATIENT)
Dept: RADIOLOGY | Facility: HOSPITAL | Age: 73
Discharge: HOME | End: 2023-10-05
Attending: INTERNAL MEDICINE
Payer: MEDICARE

## 2023-10-05 DIAGNOSIS — Z13.6 ENCOUNTER FOR SCREENING FOR CARDIOVASCULAR DISORDERS: ICD-10-CM

## 2023-10-05 DIAGNOSIS — Z82.49 FAMILY HISTORY OF CAROTID ARTERY STENOSIS: ICD-10-CM

## 2023-10-05 PROCEDURE — 75571 CT CALCIUM SCORING CARDIAC: ICD-10-PCS | Mod: 26,HCNC,, | Performed by: STUDENT IN AN ORGANIZED HEALTH CARE EDUCATION/TRAINING PROGRAM

## 2023-10-05 PROCEDURE — 75571 CT HRT W/O DYE W/CA TEST: CPT | Mod: 26,HCNC,, | Performed by: STUDENT IN AN ORGANIZED HEALTH CARE EDUCATION/TRAINING PROGRAM

## 2023-10-05 PROCEDURE — 75571 CT HRT W/O DYE W/CA TEST: CPT | Mod: TC,HCNC

## 2023-10-06 ENCOUNTER — TELEPHONE (OUTPATIENT)
Dept: PRIMARY CARE CLINIC | Facility: CLINIC | Age: 73
End: 2023-10-06
Payer: MEDICARE

## 2023-10-16 ENCOUNTER — HOSPITAL ENCOUNTER (OUTPATIENT)
Dept: CARDIOLOGY | Facility: HOSPITAL | Age: 73
Discharge: HOME | End: 2023-10-16
Attending: INTERNAL MEDICINE
Payer: MEDICARE

## 2023-10-16 DIAGNOSIS — R09.89 OTHER SPECIFIED SYMPTOMS AND SIGNS INVOLVING THE CIRCULATORY AND RESPIRATORY SYSTEMS: ICD-10-CM

## 2023-10-16 DIAGNOSIS — Z82.49 FAMILY HISTORY OF CAROTID ARTERY STENOSIS: ICD-10-CM

## 2023-10-16 PROCEDURE — 93880 CV US DOPPLER CAROTID (CUPID ONLY): ICD-10-PCS | Mod: 26,HCNC,, | Performed by: INTERNAL MEDICINE

## 2023-10-16 PROCEDURE — 93880 EXTRACRANIAL BILAT STUDY: CPT | Mod: 26,HCNC,, | Performed by: INTERNAL MEDICINE

## 2023-10-16 PROCEDURE — 93880 EXTRACRANIAL BILAT STUDY: CPT | Mod: HCNC

## 2023-10-17 LAB
LEFT ARM DIASTOLIC BLOOD PRESSURE: 60 MMHG
LEFT ARM SYSTOLIC BLOOD PRESSURE: 100 MMHG
LEFT CBA DIAS: 18 CM/S
LEFT CBA SYS: 69 CM/S
LEFT CCA DIST DIAS: 22 CM/S
LEFT CCA DIST SYS: 68 CM/S
LEFT CCA MID DIAS: 20 CM/S
LEFT CCA MID SYS: 71 CM/S
LEFT CCA PROX DIAS: 23 CM/S
LEFT CCA PROX SYS: 79 CM/S
LEFT ECA DIAS: 8 CM/S
LEFT ECA SYS: 60 CM/S
LEFT ICA DIST DIAS: 23 CM/S
LEFT ICA DIST SYS: 74 CM/S
LEFT ICA MID DIAS: 31 CM/S
LEFT ICA MID SYS: 84 CM/S
LEFT ICA PROX DIAS: 14 CM/S
LEFT ICA PROX SYS: 62 CM/S
LEFT VERTEBRAL DIAS: 7 CM/S
LEFT VERTEBRAL SYS: 60 CM/S
OHS CV CAROTID RIGHT ICA EDV HIGHEST: 27
OHS CV CAROTID ULTRASOUND LEFT ICA/CCA RATIO: 1.24
OHS CV CAROTID ULTRASOUND RIGHT ICA/CCA RATIO: 1.12
OHS CV PV CAROTID LEFT HIGHEST CCA: 79
OHS CV PV CAROTID LEFT HIGHEST ICA: 84
OHS CV PV CAROTID RIGHT HIGHEST CCA: 119
OHS CV PV CAROTID RIGHT HIGHEST ICA: 87
OHS CV US CAROTID LEFT HIGHEST EDV: 31
RIGHT ARM DIASTOLIC BLOOD PRESSURE: 60 MMHG
RIGHT ARM SYSTOLIC BLOOD PRESSURE: 100 MMHG
RIGHT CBA DIAS: 16 CM/S
RIGHT CBA SYS: 67 CM/S
RIGHT CCA DIST DIAS: 17 CM/S
RIGHT CCA DIST SYS: 78 CM/S
RIGHT CCA MID DIAS: 18 CM/S
RIGHT CCA MID SYS: 68 CM/S
RIGHT CCA PROX DIAS: 20 CM/S
RIGHT CCA PROX SYS: 119 CM/S
RIGHT ECA DIAS: 15 CM/S
RIGHT ECA SYS: 97 CM/S
RIGHT ICA DIST DIAS: 27 CM/S
RIGHT ICA DIST SYS: 87 CM/S
RIGHT ICA MID DIAS: 23 CM/S
RIGHT ICA MID SYS: 76 CM/S
RIGHT ICA PROX DIAS: 12 CM/S
RIGHT ICA PROX SYS: 60 CM/S
RIGHT VERTEBRAL DIAS: 4 CM/S
RIGHT VERTEBRAL SYS: 29 CM/S

## 2024-01-26 ENCOUNTER — PATIENT OUTREACH (OUTPATIENT)
Dept: ADMINISTRATIVE | Facility: HOSPITAL | Age: 74
End: 2024-01-26
Payer: MEDICARE

## 2024-01-26 NOTE — PROGRESS NOTES

## 2024-01-29 ENCOUNTER — PATIENT OUTREACH (OUTPATIENT)
Dept: ADMINISTRATIVE | Facility: HOSPITAL | Age: 74
End: 2024-01-29
Payer: MEDICARE

## 2024-01-29 NOTE — PROGRESS NOTES

## 2024-02-18 NOTE — PROGRESS NOTES
Reviewed ultrasound report and images. No significant plaque is present. CACS (CT) is 0. In view of elevated LDL it is reasonable to continue the statin recently prescribed by Dr. Belcher, but aspirin is unnecessary. FUP with IM and return to Cardiology PRN.

## 2024-03-14 ENCOUNTER — TELEPHONE (OUTPATIENT)
Dept: PRIMARY CARE CLINIC | Facility: CLINIC | Age: 74
End: 2024-03-14
Payer: MEDICARE

## 2024-03-14 DIAGNOSIS — Z20.828 EXPOSURE TO THE FLU: Primary | ICD-10-CM

## 2024-03-14 RX ORDER — BALOXAVIR MARBOXIL 40 MG/1
40 TABLET, FILM COATED ORAL ONCE
Qty: 1 TABLET | Refills: 0 | Status: SHIPPED | OUTPATIENT
Start: 2024-03-14 | End: 2024-03-14

## 2024-03-14 RX ORDER — OSELTAMIVIR PHOSPHATE 75 MG/1
75 CAPSULE ORAL DAILY
Qty: 10 CAPSULE | Refills: 0 | Status: SHIPPED | OUTPATIENT
Start: 2024-03-14 | End: 2024-03-24

## 2024-03-14 NOTE — TELEPHONE ENCOUNTER
----- Message from Galina Otero sent at 3/14/2024 12:42 PM CDT -----  Contact: [facility]  Pharmacy called, stated that rx baloxavir marboxiL (XOFLUZA) 40 mg tablet is not cover by insurance, but they will cover tamiflu. [facility] - [place] [facility] - [address]   Phone: [phone]  Fax: [phone]    Please call and advise. Thank you.

## 2024-05-06 DIAGNOSIS — G47.00 INSOMNIA, UNSPECIFIED TYPE: ICD-10-CM

## 2024-05-06 RX ORDER — ESZOPICLONE 2 MG/1
2 TABLET, FILM COATED ORAL NIGHTLY PRN
Qty: 30 TABLET | Refills: 3 | Status: SHIPPED | OUTPATIENT
Start: 2024-05-06

## 2024-05-06 NOTE — TELEPHONE ENCOUNTER
No care due was identified.  [facility] Embedded Care Due Messages. Reference number: [id number].   5/06/2024 3:49:45 PM CDT

## 2024-09-11 NOTE — PROGRESS NOTES
[facility] Progress Note  2024          Reason for Visit:      had concerns including Annual Exam.   This note was generated with the assistance of ambient listening technology. Verbal consent was obtained by the patient and accompanying visitor(s) for the recording of patient appointment to facilitate this note. I attest to having reviewed and edited the generated note for accuracy, though some syntax or spelling errors may persist. Please contact the author of this note for any clarification.    History of Present Illness:       Palpitations  She reports a history of palpitations and recently saw cardiology for evaluation. A calcium score test showed a score of zero. She denies any concerning findings on recent cardiac workup. A recent EKG was performed, though no Holter monitor was used. Overall, cardiology reassured her that everything was looking okay. She reports a family history of atrial fibrillation, with her father having  from AFib and her brother currently having the condition.  She had a cardiac evaluation about 10 years ago - stress test was abnormal, but coronary calcium score was 0  Dad had MI in early 50s.       MDD  Pt on Wellbutrin  mg  Her depression is well-managed with Wellbutrin. She expresses uncertainty about the necessity of continuing the medication but notes that when she previously discontinued it, she experienced persistent negative mood. She acknowledges that the medication is not causing any side effects and is helping to maintain her mood stability.  Sees Gurdeep Bourgeois. [place].      Colon Ca -   She has a history of colon cancer resection with no new issues or subsequent treatments required. She is scheduled for a follow-up colonoscopy in  with Dr. Cornejo. She reports improvement in constipation following the surgical removal of three feet of her colon, noting faster transit time.       Senile purpura  Occasional bruising.        Hypothyroidism  She reports a history of complete thyroidectomy due to thyroid nodules. She is currently taking levothyroxine 200 mcg and denies experiencing any symptoms suggestive of hyper- or hypothyroidism.  Pt on Levothyroxine 200 mcg/d.   Recent thyroid functions were normal        Insomnia   Lunesta prn 1-2/wk.  Sees Gurdeep Bourgeois     Allergic Rhinitis  Has been doing ok recently     IBS/Chronic Constipation   Resolved.  She walks her dog 2/day. She sometimes gets diarrhea now and tries to eat smaller meals since her Bowel Sx.   Previously on linzess. Pretty much resolved          H/o Osteoporosis now Osteopenia -   BONE HEALTH:  Recent DEXA scan on 1/26/24 showed improvement with a T-score of -1.4, representing a 2.8% increase at the hip and 7.9% increase at the lumbar spine. She has transitioned from osteoporosis to osteopenia and is no longer on Prolia. She acknowledges that walking and weight-bearing exercises have been beneficial for her bone health.    CHOLESTEROL MANAGEMENT:  She reports taking a statin medication for cholesterol management. She experienced some leg pain as a side effect but found relief by taking CoQ10 supplement as recommended by Sofiya. She states the CoQ10 has been helpful in managing the leg pain associated with statin use and denies any other adverse effects from the statin medication.    MUSCULOSKELETAL CONCERNS:  She reports occasional back pain and has been attending physical therapy sessions at a facility she believes is called [facility]. The therapist provides exercises for home use and performs a 30-minute massage during sessions. She has been doing the prescribed exercises, including those that can be done i  n bed before waking up, which she finds helpful. While the exercises have improved her symptoms, they have not completely eliminated the pain. She attributes the back pain to typical arthritis. She initially attended physical therapy twice weekly but has  reduced frequency due to cost considerations and continues to perform the exercises at home.      HM  She desires COVID-19 and flu vaccinations before her upcoming trip to [place] next month, expressing concern about potential exposure in airports. She is due for a mammogram and plans to have it done at [facility], switching from her previous provider ([facility]) due to dissatisfaction with COVID-19 precautions.         Problem List:     Patient Active Problem List   Diagnosis    IBS (irritable bowel syndrome)    Postoperative hypothyroidism    Gastroesophageal reflux disease    Seasonal allergies    Hematuria    Depression    Insomnia    History of osteoporosis -- DXA 03/2016 was normal    History of UTI    Dysuria    Vaginal atrophy    Chronic constipation    Myalgia of pelvic floor    Polyp of colon    Malignant neoplasm of colon    Iron deficiency anemia    Preop examination    Screening mammogram, encounter for    Seasonal allergic rhinitis due to pollen    Dizziness    Other nonthrombocytopenic purpura    MDD (major depressive disorder), recurrent episode, mild         Medications:       Current Outpatient Medications:     buPROPion (WELLBUTRIN XL) 150 MG TB24 tablet, Take 1 tablet (150 mg total) by mouth once daily., Disp: 90 tablet, Rfl: 3    calcium-vitamin D3 (CALCIUM 500 + D) 500 mg-5 mcg (200 unit) per tablet, Take 1 tablet by mouth once daily., Disp: , Rfl:     eszopiclone (LUNESTA) 2 MG Tab, Take 1 tablet (2 mg total) by mouth nightly as needed., Disp: 30 tablet, Rfl: 3    fexofenadine HCl (ALLEGRA ALLERGY ORAL), Take 1 tablet by mouth as needed., Disp: , Rfl:     hydrocortisone 2.5 % cream, Apply topically 2 (two) times daily as needed., Disp: , Rfl:     LUTEIN ORAL, Take 1 tablet by mouth once daily., Disp: , Rfl:     multivitamin (ONE DAILY MULTIVITAMIN) per tablet, Take 1 tablet by mouth once daily., Disp: , Rfl:     rosuvastatin (CRESTOR) 10 MG tablet, Take 1 tablet (10 mg total) by mouth once daily.,  "Disp: 90 tablet, Rfl: 3    tretinoin (RETIN-A) 0.05 % cream, Apply 0.05 % topically every evening., Disp: , Rfl:     triamcinolone acetonide 0.1% (KENALOG) 0.1 % cream, as needed., Disp: , Rfl:     azelastine (ASTELIN) 137 mcg (0.1 %) nasal spray, 1 spray (137 mcg total) by Nasal route 2 (two) times daily. (Patient taking differently: 1 spray by Nasal route as needed.), Disp: 30 mL, Rfl: 3    levothyroxine (SYNTHROID) 200 MCG tablet, Take 1 tablet (200 mcg total) by mouth once daily., Disp: 90 tablet, Rfl: 2    PROLIA 60 mg/mL Syrg, Inject 60 mg into the skin every 6 (six) months., Disp: , Rfl:         Review of Systems:     Review of Systems   Constitutional:  Negative for activity change, chills, fever and unexpected weight change.   HENT:  Negative for ear pain, hearing loss, rhinorrhea, sore throat and trouble swallowing.    Eyes:  Negative for discharge and visual disturbance.   Respiratory:  Negative for cough, chest tightness, shortness of breath and wheezing.    Cardiovascular:  Negative for chest pain and palpitations.   Gastrointestinal:  Negative for blood in stool, constipation, diarrhea, nausea and vomiting.   Endocrine: Negative for polydipsia and polyuria.   Genitourinary:  Negative for difficulty urinating, dysuria, hematuria and menstrual problem.   Musculoskeletal:  Negative for arthralgias, joint swelling, neck pain and joint deformity.   Neurological:  Negative for dizziness, weakness and headaches.   Psychiatric/Behavioral:  Negative for confusion and dysphoric mood.            Physical Exam:     Temp:             Blood Pressure:  100/76        Pulse:   (!) 56     Respirations:     Weight:   63.5 kg (139 lb 15.9 oz)  Height:   5' 6" (1.676 m)  BMI:     Body mass index is 22.6 kg/m².    Physical Exam  Constitutional:       General: She is not in acute distress.  HENT:      Right Ear: Tympanic membrane normal.      Left Ear: Tympanic membrane normal.      Nose: No congestion or rhinorrhea.      " Mouth/Throat:      Pharynx: No oropharyngeal exudate or posterior oropharyngeal erythema.   Cardiovascular:      Rate and Rhythm: Normal rate and regular rhythm.   Pulmonary:      Effort: Pulmonary effort is normal. No respiratory distress.      Breath sounds: No wheezing.   Abdominal:      Palpations: Abdomen is soft.      Tenderness: There is no abdominal tenderness.   Skin:     General: Skin is warm.   Neurological:      General: No focal deficit present.      Mental Status: She is alert and oriented to person, place, and time.           Labs/Imaging/Testing:     Most recent CBC:  Lab Results   Component Value Date    WBC 4.92 09/25/2023    HGB 13.8 09/25/2023     09/25/2023    MCV 93 09/25/2023       Most recent Lipids:  Lab Results   Component Value Date    CHOL 215 (H) 09/25/2023    HDL 63 09/25/2023    LDLCALC 131.8 09/25/2023    TRIG 101 09/25/2023       Most recent Chemistry:  Lab Results   Component Value Date     09/25/2023    K 4.2 09/25/2023     09/25/2023    CO2 24 09/25/2023    BUN 13 09/25/2023    CREATININE 0.8 09/25/2023    GLU 86 09/25/2023    CALCIUM 8.7 09/25/2023    ALT 11 09/25/2023    AST 19 09/25/2023    ALKPHOS 46 (L) 09/25/2023    PROT 6.4 09/25/2023    ALBUMIN 3.9 09/25/2023       Other tests:  Lab Results   Component Value Date    TSH 2.659 09/25/2023    FREET4 1.01 09/25/2023    BNROPUGE85 422 03/09/2017    DYGFYQIR30KY 44 03/09/2017    MG 1.8 03/09/2017         Assessment and Plan:     1. Malignant neoplasm of colon, unspecified part of colon  - CBC Auto Differential; Future  - Comprehensive Metabolic Panel; Future  - Lipid Panel; Future  - Hemoglobin A1C; Future  - TSH; Future  - T4, Free; Future    2. Other nonthrombocytopenic purpura  - CBC Auto Differential; Future  - Comprehensive Metabolic Panel; Future  - Lipid Panel; Future  - Hemoglobin A1C; Future  - TSH; Future  - T4, Free; Future    3. MDD (major depressive disorder), recurrent episode, mild  - CBC Auto  Differential; Future  - Comprehensive Metabolic Panel; Future  - Lipid Panel; Future  - Hemoglobin A1C; Future  - TSH; Future  - T4, Free; Future    4. Postoperative hypothyroidism  - CBC Auto Differential; Future  - Comprehensive Metabolic Panel; Future  - Lipid Panel; Future  - Hemoglobin A1C; Future  - TSH; Future  - T4, Free; Future  - levothyroxine (SYNTHROID) 200 MCG tablet; Take 1 tablet (200 mcg total) by mouth once daily.  Dispense: 90 tablet; Refill: 2    5. Insomnia, unspecified type    6. Seasonal allergic rhinitis due to pollen    7. Irritable bowel syndrome with constipation    8. Osteopenia, unspecified location  - Assessed bone density improvement based on recent DEXA scan results, showing positive changes in T-score and percentage increases at hip and lumbar spine.  - Patient to continue weight-bearing exercises to maintain bone density improvements.  - Follow up in 2 years for consideration of DEXA scan for continued bone density monitoring.    9. Dyslipidemia  - Lipid Panel; Future    10. Encounter for screening mammogram for malignant neoplasm of breast  - Mammo Digital Screening Bilat w/ Donavon; Future    11. IFG (impaired fasting glucose)  - Hemoglobin A1C; Future     OSTEOPOROSIS:    Assessment & Plan      CARDIOVASCULAR HEALTH:  - Reviewed recent cardiology evaluation, including normal coronary calcium score and EKG, indicating low cardiac risk despite family history of AFib.  - Evaluated low resting heart rate (59-60 bpm) in context of patient's overall health, determining it is within normal range.    DEPRESSION:  - Assessed effectiveness of Wellbutrin for depression management.  - Continued Wellbutrin at current dose for depression management.    THYROID DISORDER:  - Evaluated thyroid function and levothyroxine dosage, determining need for repeat labs.  - Continued levothyroxine 200 mcg daily for thyroid replacement.    HYPERLIPIDEMIA:  - Considered statin therapy effectiveness and side  effects, noting patient's use of CoQ10 for leg pain management.  - Continued statin therapy; patient to continue using CoQ10 for management of leg pain side effect.      BACK PAIN:  - Considered physical therapy and home exercises for management of occasional back pain, likely due to arthritis.  - Patient to maintain current home exercise regimen for back pain management.    INSOMNIA:  - Continued Lunesta as needed for insomnia.    LABS:  - Ordered CBC, CMP, lipid panel, A1c, thyroid function tests as routine labs.    MAMMOGRAM:  - Ordered screening mammogram.    FOLLOW UP:  - Follow up after completing ordered labs at the on-site laboratory today; contact the office for lab result review; follow up to schedule mammogram at [facility] facility when due (after September).         No follow-ups on file.       Rashad Belcher MD  9/12/2024    This note was prepared using voice-recognition software.  Although efforts are made to proofread the note, some errors may persist in the final document.

## 2024-09-12 ENCOUNTER — OFFICE VISIT (OUTPATIENT)
Dept: PRIMARY CARE CLINIC | Facility: CLINIC | Age: 74
End: 2024-09-12
Payer: MEDICARE

## 2024-09-12 ENCOUNTER — LAB VISIT (OUTPATIENT)
Dept: LAB | Facility: HOSPITAL | Age: 74
End: 2024-09-12
Attending: INTERNAL MEDICINE
Payer: MEDICARE

## 2024-09-12 VITALS
BODY MASS INDEX: 22.5 KG/M2 | SYSTOLIC BLOOD PRESSURE: 100 MMHG | WEIGHT: 140 LBS | DIASTOLIC BLOOD PRESSURE: 76 MMHG | HEIGHT: 66 IN | OXYGEN SATURATION: 98 % | HEART RATE: 56 BPM

## 2024-09-12 DIAGNOSIS — G47.00 INSOMNIA, UNSPECIFIED TYPE: ICD-10-CM

## 2024-09-12 DIAGNOSIS — M85.80 OSTEOPENIA, UNSPECIFIED LOCATION: ICD-10-CM

## 2024-09-12 DIAGNOSIS — D69.2 OTHER NONTHROMBOCYTOPENIC PURPURA: ICD-10-CM

## 2024-09-12 DIAGNOSIS — F33.0 MDD (MAJOR DEPRESSIVE DISORDER), RECURRENT EPISODE, MILD: ICD-10-CM

## 2024-09-12 DIAGNOSIS — R73.01 IFG (IMPAIRED FASTING GLUCOSE): ICD-10-CM

## 2024-09-12 DIAGNOSIS — C18.9 MALIGNANT NEOPLASM OF COLON, UNSPECIFIED PART OF COLON: ICD-10-CM

## 2024-09-12 DIAGNOSIS — K58.1 IRRITABLE BOWEL SYNDROME WITH CONSTIPATION: ICD-10-CM

## 2024-09-12 DIAGNOSIS — Z12.31 ENCOUNTER FOR SCREENING MAMMOGRAM FOR MALIGNANT NEOPLASM OF BREAST: ICD-10-CM

## 2024-09-12 DIAGNOSIS — E78.5 DYSLIPIDEMIA: ICD-10-CM

## 2024-09-12 DIAGNOSIS — E89.0 POSTOPERATIVE HYPOTHYROIDISM: ICD-10-CM

## 2024-09-12 DIAGNOSIS — J30.1 SEASONAL ALLERGIC RHINITIS DUE TO POLLEN: ICD-10-CM

## 2024-09-12 DIAGNOSIS — E89.0 POSTOPERATIVE HYPOTHYROIDISM: Primary | ICD-10-CM

## 2024-09-12 LAB
ALBUMIN SERPL BCP-MCNC: 4.2 G/DL (ref 3.5–5.2)
ALP SERPL-CCNC: 78 U/L (ref 55–135)
ALT SERPL W/O P-5'-P-CCNC: 17 U/L (ref 10–44)
ANION GAP SERPL CALC-SCNC: 9 MMOL/L (ref 8–16)
AST SERPL-CCNC: 21 U/L (ref 10–40)
BASOPHILS # BLD AUTO: 0.06 K/UL (ref 0–0.2)
BASOPHILS NFR BLD: 1 % (ref 0–1.9)
BILIRUB SERPL-MCNC: 0.4 MG/DL (ref 0.1–1)
BUN SERPL-MCNC: 14 MG/DL (ref 8–23)
CALCIUM SERPL-MCNC: 9.9 MG/DL (ref 8.7–10.5)
CHLORIDE SERPL-SCNC: 110 MMOL/L (ref 95–110)
CHOLEST SERPL-MCNC: 156 MG/DL (ref 120–199)
CHOLEST/HDLC SERPL: 2.4 {RATIO} (ref 2–5)
CO2 SERPL-SCNC: 23 MMOL/L (ref 23–29)
CREAT SERPL-MCNC: 0.9 MG/DL (ref 0.5–1.4)
DIFFERENTIAL METHOD BLD: NORMAL
EOSINOPHIL # BLD AUTO: 0.1 K/UL (ref 0–0.5)
EOSINOPHIL NFR BLD: 0.8 % (ref 0–8)
ERYTHROCYTE [DISTWIDTH] IN BLOOD BY AUTOMATED COUNT: 12.9 % (ref 11.5–14.5)
EST. GFR  (NO RACE VARIABLE): >60 ML/MIN/1.73 M^2
ESTIMATED AVG GLUCOSE: 108 MG/DL (ref 68–131)
GLUCOSE SERPL-MCNC: 98 MG/DL (ref 70–110)
HBA1C MFR BLD: 5.4 % (ref 4–5.6)
HCT VFR BLD AUTO: 44.8 % (ref 37–48.5)
HDLC SERPL-MCNC: 64 MG/DL (ref 40–75)
HDLC SERPL: 41 % (ref 20–50)
HGB BLD-MCNC: 14.4 G/DL (ref 12–16)
IMM GRANULOCYTES # BLD AUTO: 0.01 K/UL (ref 0–0.04)
IMM GRANULOCYTES NFR BLD AUTO: 0.2 % (ref 0–0.5)
LDLC SERPL CALC-MCNC: 71.6 MG/DL (ref 63–159)
LYMPHOCYTES # BLD AUTO: 2.3 K/UL (ref 1–4.8)
LYMPHOCYTES NFR BLD: 38.4 % (ref 18–48)
MCH RBC QN AUTO: 31 PG (ref 27–31)
MCHC RBC AUTO-ENTMCNC: 32.1 G/DL (ref 32–36)
MCV RBC AUTO: 96 FL (ref 82–98)
MONOCYTES # BLD AUTO: 0.5 K/UL (ref 0.3–1)
MONOCYTES NFR BLD: 8.9 % (ref 4–15)
NEUTROPHILS # BLD AUTO: 3 K/UL (ref 1.8–7.7)
NEUTROPHILS NFR BLD: 50.7 % (ref 38–73)
NONHDLC SERPL-MCNC: 92 MG/DL
NRBC BLD-RTO: 0 /100 WBC
PLATELET # BLD AUTO: 259 K/UL (ref 150–450)
PMV BLD AUTO: 10.2 FL (ref 9.2–12.9)
POTASSIUM SERPL-SCNC: 4 MMOL/L (ref 3.5–5.1)
PROT SERPL-MCNC: 7 G/DL (ref 6–8.4)
RBC # BLD AUTO: 4.65 M/UL (ref 4–5.4)
SODIUM SERPL-SCNC: 142 MMOL/L (ref 136–145)
T4 FREE SERPL-MCNC: 0.96 NG/DL (ref 0.71–1.51)
TRIGL SERPL-MCNC: 102 MG/DL (ref 30–150)
TSH SERPL DL<=0.005 MIU/L-ACNC: 1.53 UIU/ML (ref 0.4–4)
WBC # BLD AUTO: 5.93 K/UL (ref 3.9–12.7)

## 2024-09-12 PROCEDURE — 83036 HEMOGLOBIN GLYCOSYLATED A1C: CPT | Mod: HCNC | Performed by: INTERNAL MEDICINE

## 2024-09-12 PROCEDURE — 84439 ASSAY OF FREE THYROXINE: CPT | Mod: HCNC | Performed by: INTERNAL MEDICINE

## 2024-09-12 PROCEDURE — 85025 COMPLETE CBC W/AUTO DIFF WBC: CPT | Mod: HCNC | Performed by: INTERNAL MEDICINE

## 2024-09-12 PROCEDURE — 84443 ASSAY THYROID STIM HORMONE: CPT | Mod: HCNC | Performed by: INTERNAL MEDICINE

## 2024-09-12 PROCEDURE — 80053 COMPREHEN METABOLIC PANEL: CPT | Mod: HCNC | Performed by: INTERNAL MEDICINE

## 2024-09-12 PROCEDURE — 99999 PR PBB SHADOW E&M-EST. PATIENT-LVL IV: CPT | Mod: PBBFAC,,, | Performed by: INTERNAL MEDICINE

## 2024-09-12 PROCEDURE — 80061 LIPID PANEL: CPT | Mod: HCNC | Performed by: INTERNAL MEDICINE

## 2024-09-12 RX ORDER — LEVOTHYROXINE SODIUM 200 UG/1
200 TABLET ORAL DAILY
Qty: 90 TABLET | Refills: 2 | Status: SHIPPED | OUTPATIENT
Start: 2024-09-12

## 2024-09-12 RX ORDER — TRETINOIN 0.5 MG/G
0.05 CREAM TOPICAL NIGHTLY
COMMUNITY
Start: 2024-07-11

## 2024-09-17 ENCOUNTER — PATIENT MESSAGE (OUTPATIENT)
Dept: PRIMARY CARE CLINIC | Facility: CLINIC | Age: 74
End: 2024-09-17
Payer: MEDICARE

## 2024-09-17 DIAGNOSIS — E78.5 DYSLIPIDEMIA: Primary | ICD-10-CM

## 2024-09-17 DIAGNOSIS — G47.00 INSOMNIA, UNSPECIFIED TYPE: ICD-10-CM

## 2024-09-17 DIAGNOSIS — F32.A DEPRESSION, UNSPECIFIED DEPRESSION TYPE: ICD-10-CM

## 2024-09-17 RX ORDER — ROSUVASTATIN CALCIUM 10 MG/1
10 TABLET, COATED ORAL DAILY
Qty: 90 TABLET | Refills: 3 | Status: SHIPPED | OUTPATIENT
Start: 2024-09-17 | End: 2025-09-17

## 2024-09-17 RX ORDER — ESZOPICLONE 2 MG/1
2 TABLET, FILM COATED ORAL NIGHTLY PRN
Qty: 30 TABLET | Refills: 5 | Status: SHIPPED | OUTPATIENT
Start: 2024-09-17

## 2024-09-17 RX ORDER — BUPROPION HYDROCHLORIDE 150 MG/1
150 TABLET ORAL DAILY
Qty: 90 TABLET | Refills: 3 | Status: SHIPPED | OUTPATIENT
Start: 2024-09-17

## 2024-09-17 NOTE — TELEPHONE ENCOUNTER
No care due was identified.  [facility] Embedded Care Due Messages. Reference number: [id number].   9/17/2024 10:19:59 AM CDT

## 2024-09-24 ENCOUNTER — HOSPITAL ENCOUNTER (OUTPATIENT)
Dept: RADIOLOGY | Facility: HOSPITAL | Age: 74
Discharge: HOME | End: 2024-09-24
Attending: INTERNAL MEDICINE
Payer: MEDICARE

## 2024-09-24 VITALS — WEIGHT: 139 LBS | BODY MASS INDEX: 22.34 KG/M2 | HEIGHT: 66 IN

## 2024-09-24 DIAGNOSIS — Z12.31 ENCOUNTER FOR SCREENING MAMMOGRAM FOR MALIGNANT NEOPLASM OF BREAST: ICD-10-CM

## 2024-09-24 PROCEDURE — 77063 BREAST TOMOSYNTHESIS BI: CPT | Mod: 26,,, | Performed by: RADIOLOGY

## 2024-09-24 PROCEDURE — 77067 SCR MAMMO BI INCL CAD: CPT | Mod: 26,,, | Performed by: RADIOLOGY

## 2024-09-24 PROCEDURE — 77063 BREAST TOMOSYNTHESIS BI: CPT | Mod: TC

## 2024-09-24 PROCEDURE — 77067 SCR MAMMO BI INCL CAD: CPT | Mod: TC

## 2024-10-04 ENCOUNTER — PATIENT MESSAGE (OUTPATIENT)
Dept: PRIMARY CARE CLINIC | Facility: CLINIC | Age: 74
End: 2024-10-04
Payer: MEDICARE

## 2024-10-23 ENCOUNTER — PATIENT MESSAGE (OUTPATIENT)
Dept: PRIMARY CARE CLINIC | Facility: CLINIC | Age: 74
End: 2024-10-23
Payer: MEDICARE

## 2024-10-23 DIAGNOSIS — E89.0 POSTOPERATIVE HYPOTHYROIDISM: ICD-10-CM

## 2024-10-23 RX ORDER — LEVOTHYROXINE SODIUM 200 UG/1
200 TABLET ORAL DAILY
Qty: 90 TABLET | Refills: 2 | Status: SHIPPED | OUTPATIENT
Start: 2024-10-23

## 2024-10-23 NOTE — TELEPHONE ENCOUNTER
Received paper fax from refill request of Levothyroxine 200 mcg; med pended to pcp electronically by Brandee mora today.

## 2024-10-23 NOTE — TELEPHONE ENCOUNTER
No care due was identified.  [facility] Embedded Care Due Messages. Reference number: [id number].   10/23/2024 9:41:45 AM CDT

## 2024-12-02 ENCOUNTER — OFFICE VISIT (OUTPATIENT)
Dept: FAMILY MEDICINE | Facility: CLINIC | Age: 74
End: 2024-12-02
Payer: MEDICARE

## 2024-12-02 VITALS
HEIGHT: 66 IN | WEIGHT: 138.88 LBS | SYSTOLIC BLOOD PRESSURE: 90 MMHG | BODY MASS INDEX: 22.32 KG/M2 | DIASTOLIC BLOOD PRESSURE: 60 MMHG | OXYGEN SATURATION: 97 % | HEART RATE: 73 BPM

## 2024-12-02 DIAGNOSIS — E89.0 POSTOPERATIVE HYPOTHYROIDISM: ICD-10-CM

## 2024-12-02 DIAGNOSIS — Z00.00 ENCOUNTER FOR PREVENTIVE HEALTH EXAMINATION: Primary | ICD-10-CM

## 2024-12-02 DIAGNOSIS — D69.2 OTHER NONTHROMBOCYTOPENIC PURPURA: ICD-10-CM

## 2024-12-02 DIAGNOSIS — J98.4 CALCIFIED GRANULOMA OF LUNG: ICD-10-CM

## 2024-12-02 DIAGNOSIS — F33.40 RECURRENT MAJOR DEPRESSIVE DISORDER, IN REMISSION: ICD-10-CM

## 2024-12-02 DIAGNOSIS — I77.9 BILATERAL CAROTID ARTERY DISEASE, UNSPECIFIED TYPE: ICD-10-CM

## 2024-12-02 DIAGNOSIS — G47.00 INSOMNIA, UNSPECIFIED TYPE: ICD-10-CM

## 2024-12-02 PROCEDURE — 3074F SYST BP LT 130 MM HG: CPT | Mod: HCNC,CPTII,S$GLB, | Performed by: NURSE PRACTITIONER

## 2024-12-02 PROCEDURE — 1126F AMNT PAIN NOTED NONE PRSNT: CPT | Mod: HCNC,CPTII,S$GLB, | Performed by: NURSE PRACTITIONER

## 2024-12-02 PROCEDURE — 3044F HG A1C LEVEL LT 7.0%: CPT | Mod: HCNC,CPTII,S$GLB, | Performed by: NURSE PRACTITIONER

## 2024-12-02 PROCEDURE — 1159F MED LIST DOCD IN RCRD: CPT | Mod: HCNC,CPTII,S$GLB, | Performed by: NURSE PRACTITIONER

## 2024-12-02 PROCEDURE — 1158F ADVNC CARE PLAN TLK DOCD: CPT | Mod: HCNC,CPTII,S$GLB, | Performed by: NURSE PRACTITIONER

## 2024-12-02 PROCEDURE — 3078F DIAST BP <80 MM HG: CPT | Mod: HCNC,CPTII,S$GLB, | Performed by: NURSE PRACTITIONER

## 2024-12-02 PROCEDURE — 99999 PR PBB SHADOW E&M-EST. PATIENT-LVL IV: CPT | Mod: PBBFAC,HCNC,, | Performed by: NURSE PRACTITIONER

## 2024-12-02 PROCEDURE — 1101F PT FALLS ASSESS-DOCD LE1/YR: CPT | Mod: HCNC,CPTII,S$GLB, | Performed by: NURSE PRACTITIONER

## 2024-12-02 PROCEDURE — 1160F RVW MEDS BY RX/DR IN RCRD: CPT | Mod: HCNC,CPTII,S$GLB, | Performed by: NURSE PRACTITIONER

## 2024-12-02 PROCEDURE — 3288F FALL RISK ASSESSMENT DOCD: CPT | Mod: HCNC,CPTII,S$GLB, | Performed by: NURSE PRACTITIONER

## 2024-12-02 PROCEDURE — 1170F FXNL STATUS ASSESSED: CPT | Mod: HCNC,CPTII,S$GLB, | Performed by: NURSE PRACTITIONER

## 2024-12-02 PROCEDURE — G0439 PPPS, SUBSEQ VISIT: HCPCS | Mod: HCNC,S$GLB,, | Performed by: NURSE PRACTITIONER

## 2024-12-02 NOTE — PATIENT INSTRUCTIONS
Counseling and Referral of Other Preventative  (Italic type indicates deductible and co-insurance are waived)    Patient Name: Ana Lilia Glass  Today's Date: 12/2/2024    Health Maintenance       Date Due Completion Date    COVID-19 Vaccine (9 - 2024-25 season) 12/02/2025 (Originally 11/12/2024) 9/17/2024    Colorectal Cancer Screening 08/18/2025 8/18/2022    Mammogram 09/24/2025 9/24/2024    Override on 3/17/2018: Done (normal, results in EPIC)    Override on 3/5/2016: Done    Cervical Cancer Screening 09/14/2026 9/14/2023    Override on 2/18/2017: Done    Override on 3/8/2016: Not Clinically Appropriate    DEXA Scan 09/21/2026 9/21/2023    Override on 4/6/2016: Done    TETANUS VACCINE 03/09/2027 3/9/2017    Lipid Panel 09/12/2029 9/12/2024        No orders of the defined types were placed in this encounter.    The following information is provided to all patients.  This information is to help you find resources for any of the problems found today that may be affecting your health:                  Living healthy guide: www.[facility].[place].gov      Understanding Diabetes: www.diabetes.org      Eating healthy: www.cdc.gov/healthyweight      CDC home safety checklist: www.cdc.gov/steadi/patient.html      Agency on Aging: www.goea.[place].[facility]      Alcoholics anonymous (AA): www.aa.org      Physical Activity: www.betty.nih.gov/qs6loqi      Tobacco use: www.quitwithusla.org

## 2024-12-02 NOTE — PROGRESS NOTES
"  Ana Lilia Glass presented for a  Medicare AWV and comprehensive Health Risk Assessment today. The following components were reviewed and updated:    Medical history  Family History  Social history  Allergies and Current Medications  Health Risk Assessment  Health Maintenance  Care Team         ** See Completed Assessments for Annual Wellness Visit within the encounter summary.**         The following assessments were completed:  Living Situation  CAGE  Depression Screening  Timed Get Up and Go  Whisper Test  Cognitive Function Screening      Nutrition Screening  ADL Screening  PAQ Screening      Opioid documentation:      Patient does not have a current opioid prescription.        Vitals:    12/02/24 1440   BP: 90/60   BP Location: Right arm   Patient Position: Sitting   Pulse: 73   SpO2: 97%   Weight: 63 kg (138 lb 14.2 oz)   Height: 5' 6" (1.676 m)     Body mass index is 22.42 kg/m².    Physical Exam  Vitals reviewed.   Constitutional:       General: She is not in acute distress.     Appearance: Normal appearance. She is well-developed and well-groomed.   HENT:      Head: Normocephalic.      Right Ear: External ear normal.      Left Ear: External ear normal.   Cardiovascular:      Rate and Rhythm: Normal rate.   Pulmonary:      Effort: Pulmonary effort is normal. No respiratory distress.   Skin:     Coloration: Skin is not pale.   Neurological:      Mental Status: She is alert and oriented to person, place, and time.      Coordination: Coordination normal.   Psychiatric:         Attention and Perception: Attention normal.         Mood and Affect: Mood and affect normal.         Speech: Speech normal.         Behavior: Behavior normal. Behavior is cooperative.         Thought Content: Thought content normal.             Diagnoses and health risks identified today and associated recommendations/orders:    1. Encounter for preventive health examination    2. Recurrent major depressive disorder, in " remission  Chronic; stable on Wellbutrin medication. Follow up with PCP.    3. Bilateral carotid artery disease, unspecified type  Chronic; stable on statin medication. Follow up with PCP.    4. Other nonthrombocytopenic purpura  As seen on previous exam. Follow up with PCP.    5. Postoperative hypothyroidism  Chronic; stable on levothyroxine medication. Follow up with PCP.    6. Calcified granuloma of lung - seen on CXR 1/22/2014  As seen on previous CXR imaging. Follow up with PCP.    7. Insomnia, unspecified type  Chronic; stable on Lunesta medication. Follow up with PCP.    8. Body mass index (BMI) of 22.0 to 22.9 in adult  Patient's last recorded BMI is WNL. Eat a low salt/low fat diet and discussed importance of engaging in physical activity at least 5x/week for a minimum of 30 min/day.      Provided Ana Lilia with a 5-10 year written screening schedule and personal prevention plan. Recommendations were developed using the USPSTF age appropriate recommendations. Education, counseling, and referrals were provided as needed. After Visit Summary printed and given to patient which includes a list of additional screenings/tests needed.    Follow up for your next annual wellness visit.    Galina Martinez NP        Advance Care Planning     I offered to discuss advanced care planning, including how to pick a person who would make decisions for you if you were unable to make them for yourself, called a health care power of , and what kind of decisions you might make such as use of life sustaining treatments such as ventilators and tube feeding when faced with a life limiting illness recorded on a living will that they will need to know. (How you want to be cared for as you near the end of your natural life)     X Patient is interested in learning more about how to make advanced directives.  I offered to discuss this with them and instructed to follow up with PCP.

## 2024-12-03 PROBLEM — I77.9 BILATERAL CAROTID ARTERY DISEASE: Status: ACTIVE | Noted: 2024-12-03

## 2024-12-03 PROBLEM — R42 DIZZINESS: Status: RESOLVED | Noted: 2022-05-10 | Resolved: 2024-12-03

## 2024-12-03 PROBLEM — Z85.038 PERSONAL HISTORY OF COLON CANCER: Status: ACTIVE | Noted: 2021-11-09

## 2024-12-03 PROBLEM — J98.4 CALCIFIED GRANULOMA OF LUNG: Status: ACTIVE | Noted: 2024-12-03

## 2024-12-03 PROBLEM — R30.0 DYSURIA: Status: RESOLVED | Noted: 2020-11-08 | Resolved: 2024-12-03

## 2024-12-03 PROBLEM — Z12.31 SCREENING MAMMOGRAM, ENCOUNTER FOR: Status: RESOLVED | Noted: 2021-11-09 | Resolved: 2024-12-03

## 2025-02-19 RX ORDER — AZELASTINE 1 MG/ML
1 SPRAY, METERED NASAL 2 TIMES DAILY
Qty: 60 ML | Refills: 1 | Status: SHIPPED | OUTPATIENT
Start: 2025-02-19

## 2025-02-19 NOTE — TELEPHONE ENCOUNTER
Refill Routing Note   Medication(s) are not appropriate for processing by [facility] for the following reason(s):        Due for refill >6 months ago  No active prescription written by provider    [facility] action(s):  Defer               Appointments  past 12m or future 3m with PCP    Date Provider   Last Visit   9/12/2024 Rashad Belcher MD   Next Visit   Visit date not found Rashad Belcher MD   ED visits in past 90 days: 0        Note composed:1:41 PM 02/19/2025

## 2025-02-19 NOTE — TELEPHONE ENCOUNTER
No care due was identified.  [facility] Embedded Care Due Messages. Reference number: [id number].   2/19/2025 10:03:25 AM CST

## 2025-03-11 ENCOUNTER — PATIENT MESSAGE (OUTPATIENT)
Dept: PRIMARY CARE CLINIC | Facility: CLINIC | Age: 75
End: 2025-03-11
Payer: MEDICARE

## 2025-03-12 ENCOUNTER — PATIENT MESSAGE (OUTPATIENT)
Dept: PRIMARY CARE CLINIC | Facility: CLINIC | Age: 75
End: 2025-03-12
Payer: MEDICARE

## 2025-03-12 NOTE — TELEPHONE ENCOUNTER
LOV with Rashad Belcher MD , 9/12/2024  Pt is concerned with the measles outbreak. Would like to know if it is recommended that she get a vaccine.

## 2025-03-14 NOTE — TELEPHONE ENCOUNTER
[facility] msg. [facility] need of an update covid vaccine. Rec she get another vaccine in the fall

## 2025-04-07 DIAGNOSIS — G47.00 INSOMNIA, UNSPECIFIED TYPE: ICD-10-CM

## 2025-04-07 RX ORDER — ESZOPICLONE 2 MG/1
2 TABLET, FILM COATED ORAL NIGHTLY PRN
Qty: 30 TABLET | Refills: 5 | Status: SHIPPED | OUTPATIENT
Start: 2025-04-07

## 2025-04-07 NOTE — TELEPHONE ENCOUNTER
No care due was identified.  [facility] Embedded Care Due Messages. Reference number: [id number].   4/07/2025 1:29:52 PM CDT

## 2025-04-14 ENCOUNTER — TELEPHONE (OUTPATIENT)
Dept: PRIMARY CARE CLINIC | Facility: CLINIC | Age: 75
End: 2025-04-14
Payer: MEDICARE

## 2025-04-14 ENCOUNTER — OFFICE VISIT (OUTPATIENT)
Dept: PRIMARY CARE CLINIC | Facility: CLINIC | Age: 75
End: 2025-04-14
Payer: MEDICARE

## 2025-04-14 VITALS
BODY MASS INDEX: 22.68 KG/M2 | OXYGEN SATURATION: 98 % | DIASTOLIC BLOOD PRESSURE: 62 MMHG | HEART RATE: 58 BPM | SYSTOLIC BLOOD PRESSURE: 114 MMHG | WEIGHT: 141.13 LBS | HEIGHT: 66 IN

## 2025-04-14 DIAGNOSIS — R51.9 SEVERE FRONTAL HEADACHES: Primary | ICD-10-CM

## 2025-04-14 DIAGNOSIS — Z85.038 HISTORY OF COLON CANCER: ICD-10-CM

## 2025-04-14 PROBLEM — C18.9 MALIGNANT NEOPLASM OF COLON, UNSPECIFIED PART OF COLON: Status: ACTIVE | Noted: 2025-04-14

## 2025-04-14 PROBLEM — C18.9 MALIGNANT NEOPLASM OF COLON, UNSPECIFIED PART OF COLON: Status: RESOLVED | Noted: 2025-04-14 | Resolved: 2025-04-14

## 2025-04-14 PROCEDURE — 99999 PR PBB SHADOW E&M-EST. PATIENT-LVL III: CPT | Mod: PBBFAC,HCNC,, | Performed by: INTERNAL MEDICINE

## 2025-04-14 RX ORDER — AMOXICILLIN AND CLAVULANATE POTASSIUM 875; 125 MG/1; MG/1
1 TABLET, FILM COATED ORAL 2 TIMES DAILY
Qty: 20 TABLET | Refills: 0 | Status: SHIPPED | OUTPATIENT
Start: 2025-04-14

## 2025-04-14 RX ORDER — BUTALBITAL, ACETAMINOPHEN AND CAFFEINE 50; 325; 40 MG/1; MG/1; MG/1
1 TABLET ORAL EVERY 4 HOURS PRN
Qty: 30 TABLET | Refills: 1 | Status: SHIPPED | OUTPATIENT
Start: 2025-04-14 | End: 2025-05-14

## 2025-04-14 NOTE — PROGRESS NOTES
[facility] Progress Note  4/14/2025          Reason for Visit:      had concerns including Headache (Headaches for 2 weeks/ frontal headache/ nausea associated with the headaches/ intermittent headaches).       History of Present Illness:     Patient presents today for headache lasting over two weeks.    HISTORY OF PRESENT ILLNESS:  She reports persistent headache for over two weeks localized to the temples, characterized by sharp pains and sensation of head fullness. While not debilitating, the headache has impacted daily activities, causing her to discontinue walking. She denies photophobia and reports that bright lights do not exacerbate symptoms. She experiences associated intermittent nausea without vomiting. She previously used Fioricet for headaches. She is currently using Azelastine nasal spray more frequently than usual at night due to nasal congestion.    MEDICAL HISTORY:  She has history of colon cancer with lymph node involvement, status post resection. She is scheduled for surveillance colonoscopy in October.    ALLERGIES:  She has allergies to Bactrim, codeine, and sulfa. The sulfa allergy has been listed since age [age], though she is uncertain about its current validity.    SOCIAL HISTORY:  She is retired, quit drinking in 2019, and is a former smoker.       Problem List:     Problem List[1]      Medications:     Current Medications[2]        Review of Systems:     Review of Systems   Constitutional:  Negative for chills and fever.   HENT:  Negative for ear pain and sore throat.    Respiratory:  Negative for cough, shortness of breath and wheezing.    Cardiovascular:  Negative for chest pain and palpitations.   Gastrointestinal:  Negative for constipation, diarrhea, nausea and vomiting.   Genitourinary:  Negative for dysuria and hematuria.   Musculoskeletal:  Negative for joint swelling and joint deformity.   Neurological:  Negative for dizziness and weakness.           Physical  "Exam:     Temp:             Blood Pressure:  114/62        Pulse:   (!) 58     Respirations:     Weight:   64 kg (141 lb 1.5 oz)  Height:   5' 6" (1.676 m)  BMI:     Body mass index is 22.77 kg/m².    Physical Exam  Constitutional:       General: She is not in acute distress.  HENT:      Right Ear: Tympanic membrane normal.      Left Ear: Tympanic membrane normal.      Nose: No congestion or rhinorrhea.      Mouth/Throat:      Pharynx: No oropharyngeal exudate or posterior oropharyngeal erythema.   Cardiovascular:      Rate and Rhythm: Normal rate and regular rhythm.   Pulmonary:      Effort: Pulmonary effort is normal. No respiratory distress.      Breath sounds: No wheezing.   Abdominal:      Palpations: Abdomen is soft.      Tenderness: There is no abdominal tenderness.   Skin:     General: Skin is warm.   Neurological:      General: No focal deficit present.      Mental Status: She is alert and oriented to person, place, and time.           Labs/Imaging/Testing:     Most recent CBC:  Lab Results   Component Value Date    WBC 5.93 09/12/2024    HGB 14.4 09/12/2024     09/12/2024    MCV 96 09/12/2024       Most recent Lipids:  Lab Results   Component Value Date    CHOL 156 09/12/2024    HDL 64 09/12/2024    LDLCALC 71.6 09/12/2024    TRIG 102 09/12/2024       Most recent Chemistry:  Lab Results   Component Value Date     09/12/2024    K 4.0 09/12/2024     09/12/2024    CO2 23 09/12/2024    BUN 14 09/12/2024    CREATININE 0.9 09/12/2024    GLU 98 09/12/2024    CALCIUM 9.9 09/12/2024    ALT 17 09/12/2024    AST 21 09/12/2024    ALKPHOS 78 09/12/2024    PROT 7.0 09/12/2024    ALBUMIN 4.2 09/12/2024       Other tests:  Lab Results   Component Value Date    TSH 1.531 09/12/2024    FREET4 0.96 09/12/2024    HGBA1C 5.4 09/12/2024    [id number] 422 03/09/2017    [id number] 44 03/09/2017    MG 1.8 03/09/2017       Assessment and Plan:     1. Severe frontal headaches  - Prescribed amoxicillin for 10 " days to treat potential sinus infection, despite lack of typical sinus symptoms.  - Patient feels like their head is full but denies postnasal drip.  - Increased use of nasal spray at night due to nasal congestion.  - Physical exam performed, checking for tenderness in sinus areas.  - Discussed high pollen levels this year as a potential contributor to sinus-related symptoms.    - Continued Fioricet (acetaminophen/butalbital/caffeine) for tension headache management.  - Headache has persisted for over 2 weeks, located in the temples with occasional sharp pain.  - Patient denies pounding pain, balance issues, fever, or photophobia, but is unsure about phonophobia.  - Physical exam performed, including sinus areas palpation and neurological assessment.  - Considered sinus infection or tension headaches as possible causes.  - Instructed patient to contact the office if headaches persist by next Monday to discuss potential neuroimaging.      2. History of colon cancer  - Monitored patient's history of colon cancer and evaluated the success of previous treatment, including lymph node removal.  - Reassured patient about the low likelihood of brain metastasis.  - Scheduled follow-up colonoscopy in October to monitor for any recurrence.    FOLLOW-UP:  - Advised follow-up if symptoms don't improve with current treatment plan.  - Potential imaging may be considered if headaches persist.       Rashad Belcher MD  4/14/2025    This note was prepared using voice-recognition software.  Although efforts are made to proofread the note, some errors may persist in the final document.         [1]   Patient Active Problem List  Diagnosis    IBS (irritable bowel syndrome)    Postoperative hypothyroidism    Gastroesophageal reflux disease    Hematuria    Recurrent major depressive disorder    Insomnia    History of osteoporosis -- DXA 03/2016 was normal    History of UTI    Vaginal atrophy    Chronic constipation    Myalgia of pelvic  floor    Polyp of colon    Personal history of colon cancer    Iron deficiency anemia    Seasonal allergic rhinitis due to pollen    Other nonthrombocytopenic purpura    Body mass index (BMI) of 22.0 to 22.9 in adult    Calcified granuloma of lung    Bilateral carotid artery disease    History of colon cancer   [2]   Current Outpatient Medications:     azelastine (ASTELIN) 137 mcg (0.1 %) nasal spray, 1 spray (137 mcg total) by Nasal route 2 (two) times daily., Disp: 60 mL, Rfl: 1    buPROPion (WELLBUTRIN XL) 150 MG TB24 tablet, Take 1 tablet (150 mg total) by mouth once daily., Disp: 90 tablet, Rfl: 3    calcium-vitamin D3 (CALCIUM 500 + D) 500 mg-5 mcg (200 unit) per tablet, Take 1 tablet by mouth once daily., Disp: , Rfl:     eszopiclone (LUNESTA) 2 MG Tab, Take 1 tablet (2 mg total) by mouth nightly as needed (insomnia)., Disp: 30 tablet, Rfl: 5    fexofenadine HCl (ALLEGRA ALLERGY ORAL), Take 1 tablet by mouth as needed., Disp: , Rfl:     hydrocortisone 2.5 % cream, Apply topically 2 (two) times daily as needed., Disp: , Rfl:     levothyroxine (SYNTHROID) 200 MCG tablet, Take 1 tablet (200 mcg total) by mouth once daily., Disp: 90 tablet, Rfl: 2    LUTEIN ORAL, Take 1 tablet by mouth once daily., Disp: , Rfl:     multivitamin (ONE DAILY MULTIVITAMIN) per tablet, Take 1 tablet by mouth once daily., Disp: , Rfl:     rosuvastatin (CRESTOR) 10 MG tablet, Take 1 tablet (10 mg total) by mouth once daily., Disp: 90 tablet, Rfl: 3    tretinoin (RETIN-A) 0.05 % cream, Apply 0.05 % topically every evening., Disp: , Rfl:     triamcinolone acetonide 0.1% (KENALOG) 0.1 % cream, as needed., Disp: , Rfl:     amoxicillin-clavulanate 875-125mg (AUGMENTIN) 875-125 mg per tablet, Take 1 tablet by mouth 2 (two) times daily., Disp: 20 tablet, Rfl: 0    butalbital-acetaminophen-caffeine -40 mg (FIORICET, ESGIC) -40 mg per tablet, Take 1 tablet by mouth every 4 (four) hours as needed for Pain., Disp: 30 tablet, Rfl: 1

## 2025-04-14 NOTE — TELEPHONE ENCOUNTER
Informed pt that her medications will be processed. Pt verbalized understanding and stated that she was on her way to  her meds.

## 2025-04-14 NOTE — TELEPHONE ENCOUNTER
----- Message from Nancy sent at 4/14/2025  3:10 PM CDT -----  Contact: Mobile  [phone]  Comments: Patient came in to see you on today April fourteenth, and she would like for you to sign off on the script her butalbital-acetaminophen-caffeine -40 mg (FIORICET, ESGIC) -40 mg per table script.  ----- Message -----  From: Nancy Cardenas  Sent: 4/14/2025   3:11 PM CDT  To: Ye Solorz[facility] Staff    TComments: Patient came in to see you on today April fourteenth, and she would like for you to approve her butalbital-acetaminophen-caffeine -40 mg (FIORICET, ESGIC) -40 mg per table script.

## 2025-04-14 NOTE — TELEPHONE ENCOUNTER
Spoke with Alma Delia with [facility] to see if they received the butalbital-acetaminophen-caffeine -40 mg (FIORICET, ESGIC) -40 mg per tablet and amoxicillin-clavulanate 875-125mg (AUGMENTIN) 875-125 mg per tablet. Alma Delia stated that they only received the Augmentin rx electronically. Gave verbal for the Fioricet rx. Verbal with readback completed. Alma Delia stated that she will process the rx and notify the pt.

## 2025-04-17 ENCOUNTER — PATIENT MESSAGE (OUTPATIENT)
Dept: PRIMARY CARE CLINIC | Facility: CLINIC | Age: 75
End: 2025-04-17
Payer: MEDICARE

## 2025-04-28 ENCOUNTER — LAB VISIT (OUTPATIENT)
Dept: LAB | Facility: HOSPITAL | Age: 75
End: 2025-04-28
Attending: INTERNAL MEDICINE
Payer: MEDICARE

## 2025-04-28 ENCOUNTER — OFFICE VISIT (OUTPATIENT)
Dept: PRIMARY CARE CLINIC | Facility: CLINIC | Age: 75
End: 2025-04-28
Payer: MEDICARE

## 2025-04-28 VITALS
HEIGHT: 66 IN | DIASTOLIC BLOOD PRESSURE: 70 MMHG | BODY MASS INDEX: 22.68 KG/M2 | SYSTOLIC BLOOD PRESSURE: 128 MMHG | HEART RATE: 64 BPM | OXYGEN SATURATION: 97 % | WEIGHT: 141.13 LBS

## 2025-04-28 DIAGNOSIS — E89.0 POSTOPERATIVE HYPOTHYROIDISM: ICD-10-CM

## 2025-04-28 DIAGNOSIS — R07.9 CHEST PAIN, UNSPECIFIED TYPE: ICD-10-CM

## 2025-04-28 DIAGNOSIS — E03.9 HYPOTHYROIDISM, UNSPECIFIED TYPE: ICD-10-CM

## 2025-04-28 DIAGNOSIS — G44.52 HEADACHE, NEW DAILY PERSISTENT (NDPH): ICD-10-CM

## 2025-04-28 DIAGNOSIS — G44.52 HEADACHE, NEW DAILY PERSISTENT (NDPH): Primary | ICD-10-CM

## 2025-04-28 LAB
ABSOLUTE EOSINOPHIL (OHS): 0.05 K/UL
ABSOLUTE MONOCYTE (OHS): 0.46 K/UL (ref 0.3–1)
ABSOLUTE NEUTROPHIL COUNT (OHS): 3.06 K/UL (ref 1.8–7.7)
ALBUMIN SERPL BCP-MCNC: 4 G/DL (ref 3.5–5.2)
ALP SERPL-CCNC: 77 UNIT/L (ref 40–150)
ALT SERPL W/O P-5'-P-CCNC: 20 UNIT/L (ref 10–44)
ANION GAP (OHS): 9 MMOL/L (ref 8–16)
AST SERPL-CCNC: 25 UNIT/L (ref 11–45)
BASOPHILS # BLD AUTO: 0.05 K/UL
BASOPHILS NFR BLD AUTO: 0.9 %
BILIRUB SERPL-MCNC: 0.3 MG/DL (ref 0.1–1)
BUN SERPL-MCNC: 14 MG/DL (ref 8–23)
CALCIUM SERPL-MCNC: 9.1 MG/DL (ref 8.7–10.5)
CHLORIDE SERPL-SCNC: 106 MMOL/L (ref 95–110)
CO2 SERPL-SCNC: 24 MMOL/L (ref 23–29)
CREAT SERPL-MCNC: 0.8 MG/DL (ref 0.5–1.4)
CRP SERPL-MCNC: <0.3 MG/L
ERYTHROCYTE [DISTWIDTH] IN BLOOD BY AUTOMATED COUNT: 13.2 % (ref 11.5–14.5)
ERYTHROCYTE [SEDIMENTATION RATE] IN BLOOD BY PHOTOMETRIC METHOD: <2 MM/HR
GFR SERPLBLD CREATININE-BSD FMLA CKD-EPI: >60 ML/MIN/1.73/M2
GLUCOSE SERPL-MCNC: 77 MG/DL (ref 70–110)
HCT VFR BLD AUTO: 44 % (ref 37–48.5)
HGB BLD-MCNC: 14 GM/DL (ref 12–16)
IMM GRANULOCYTES # BLD AUTO: 0.02 K/UL (ref 0–0.04)
IMM GRANULOCYTES NFR BLD AUTO: 0.4 % (ref 0–0.5)
LYMPHOCYTES # BLD AUTO: 1.94 K/UL (ref 1–4.8)
MCH RBC QN AUTO: 30.8 PG (ref 27–31)
MCHC RBC AUTO-ENTMCNC: 31.8 G/DL (ref 32–36)
MCV RBC AUTO: 97 FL (ref 82–98)
NUCLEATED RBC (/100WBC) (OHS): 0 /100 WBC
PLATELET # BLD AUTO: 230 K/UL (ref 150–450)
PMV BLD AUTO: 10.8 FL (ref 9.2–12.9)
POTASSIUM SERPL-SCNC: 4 MMOL/L (ref 3.5–5.1)
PROT SERPL-MCNC: 6.9 GM/DL (ref 6–8.4)
RBC # BLD AUTO: 4.55 M/UL (ref 4–5.4)
RELATIVE EOSINOPHIL (OHS): 0.9 %
RELATIVE LYMPHOCYTE (OHS): 34.8 % (ref 18–48)
RELATIVE MONOCYTE (OHS): 8.2 % (ref 4–15)
RELATIVE NEUTROPHIL (OHS): 54.8 % (ref 38–73)
SODIUM SERPL-SCNC: 139 MMOL/L (ref 136–145)
T4 FREE SERPL-MCNC: 0.91 NG/DL (ref 0.71–1.51)
TSH SERPL-ACNC: 1.37 UIU/ML (ref 0.4–4)
WBC # BLD AUTO: 5.58 K/UL (ref 3.9–12.7)

## 2025-04-28 PROCEDURE — 85025 COMPLETE CBC W/AUTO DIFF WBC: CPT

## 2025-04-28 PROCEDURE — 36415 COLL VENOUS BLD VENIPUNCTURE: CPT | Mod: PN

## 2025-04-28 PROCEDURE — 86140 C-REACTIVE PROTEIN: CPT

## 2025-04-28 PROCEDURE — 80053 COMPREHEN METABOLIC PANEL: CPT

## 2025-04-28 PROCEDURE — 84443 ASSAY THYROID STIM HORMONE: CPT

## 2025-04-28 PROCEDURE — 85652 RBC SED RATE AUTOMATED: CPT

## 2025-04-28 PROCEDURE — 84439 ASSAY OF FREE THYROXINE: CPT

## 2025-04-28 PROCEDURE — 99999 PR PBB SHADOW E&M-EST. PATIENT-LVL IV: CPT | Mod: PBBFAC,,, | Performed by: INTERNAL MEDICINE

## 2025-04-28 NOTE — PROGRESS NOTES
"  [facility] Progress Note  2025          Reason for Visit:      had concerns including Headache and Chest Pain.       History of Present Illness:     Patient presents today for persistent headaches not improving after antibiotic treatment    She reports debilitating bilateral temple headaches with sharp pain and pressure sensation, describing feeling like her head is "really big." Tylenol and ibuprofen have not provided relief. She denies light sensitivity, neurologic symptoms, thinking difficulties, or memory issues.     She also reports sternal chest pain starting three days ago that is intermittent with mild discomfort on deep breathing.    She reports significant fatigue affecting her usual activities. She could barely complete two laps around her complex compared to her usual three laps and denies any unusual exertion due to not feeling well overall.    Her father  at age 56 from heart problems. Her mother had bilateral carotid artery blockage.    She undergoes cardiac evaluation every 5 years at [facility] due to family history of early cardiac death. Most recent cardiac calcium score showed zero plaque.           Problem List:     Problem List[1]      Medications:     Current Medications[2]      Review of Systems:     Review of Systems   Constitutional:  Negative for chills and fever.   HENT:  Negative for ear pain and sore throat.    Respiratory:  Negative for cough, shortness of breath and wheezing.    Cardiovascular:  Negative for chest pain and palpitations.   Gastrointestinal:  Negative for constipation, diarrhea, nausea and vomiting.   Genitourinary:  Negative for dysuria and hematuria.   Musculoskeletal:  Negative for joint swelling and joint deformity.   Neurological:  Negative for dizziness and weakness.         Physical Exam:     Temp:             Blood Pressure:  128/70        Pulse:   64     Respirations:     Weight:   64 kg (141 lb 1.5 oz)  Height:   5' 6" (1.676 " m)  BMI:     Body mass index is 22.77 kg/m².    Physical Exam  Constitutional:       General: She is not in acute distress.  HENT:      Right Ear: Tympanic membrane normal.      Left Ear: Tympanic membrane normal.      Nose: No congestion or rhinorrhea.      Mouth/Throat:      Pharynx: No oropharyngeal exudate or posterior oropharyngeal erythema.   Cardiovascular:      Rate and Rhythm: Normal rate and regular rhythm.   Pulmonary:      Effort: Pulmonary effort is normal. No respiratory distress.      Breath sounds: No wheezing.   Abdominal:      Palpations: Abdomen is soft.      Tenderness: There is no abdominal tenderness.   Skin:     General: Skin is warm.   Neurological:      General: No focal deficit present.      Mental Status: She is alert and oriented to person, place, and time.       Labs/Imaging/Testing:     Most recent CBC:  Lab Results   Component Value Date    WBC 5.93 09/12/2024    HGB 14.4 09/12/2024     09/12/2024    MCV 96 09/12/2024       Most recent Lipids:  Lab Results   Component Value Date    CHOL 156 09/12/2024    HDL 64 09/12/2024    LDLCALC 71.6 09/12/2024    TRIG 102 09/12/2024       Most recent Chemistry:  Lab Results   Component Value Date     09/12/2024    K 4.0 09/12/2024     09/12/2024    CO2 23 09/12/2024    BUN 14 09/12/2024    CREATININE 0.9 09/12/2024    GLU 98 09/12/2024    CALCIUM 9.9 09/12/2024    ALT 17 09/12/2024    AST 21 09/12/2024    ALKPHOS 78 09/12/2024    PROT 7.0 09/12/2024    ALBUMIN 4.2 09/12/2024       Other tests:  Lab Results   Component Value Date    TSH 1.531 09/12/2024    FREET4 0.96 09/12/2024    HGBA1C 5.4 09/12/2024    [id number] 422 03/09/2017    [id number] 44 03/09/2017    MG 1.8 03/09/2017       Assessment and Plan:     Visit Summary:  Considered and ordered MRI head due to persistent headaches unresponsive to initial antibiotic treatment.  Considered referral to neurologist if MRI results are unremarkable, to explore potential migraine  diagnosis.  Performed mini-mental status evaluation to assess cognitive function in relation to headaches; results were normal. (MMSE 30/30)    - Ordered chest radiograph to investigate chest pain  - Ordered comprehensive labs including thyroid function tests  - Ordered MRI to investigate headaches and underlying causes  - Consider neurologist referral if MRI results are unremarkable  - Follow up after completion of ordered tests       1. Headache, new daily persistent (NDPH)  - Patient reports persistent, debilitating bilateral temple headaches, sometimes with sharp pain.  - Denies visual disturbances or other neurological symptoms.  - Comprehensive neurological exam and mini-mental status evaluation were normal.  - Previous treatments with ibuprofen, Tylenol, and antibiotics were ineffective.  - Ordered MRI and labs to investigate underlying causes.  - Will consider neurologist referral if MRI results are unremarkable to explore potential migraine diagnosis.    - CBC Auto Differential; Future  - Comprehensive Metabolic Panel; Future  - TSH; Future  - Sedimentation rate; Future  - C-Reactive Protein; Future  - X-Ray Chest PA And Lateral; Future  - MRI Brain Without Contrast; Future    2. Chest pain, unspecified type    - Patient reports 3-day history of intermittent chest pain exacerbated by deep breathing and palpation, with associated mild dyspnea.  - Physical exam revealed tenderness upon palpation in the described area, with normal chest auscultation.  - Ordered chest radiograph to investigate the cause, to be scheduled at patient's convenience.    - X-Ray Chest PA And Lateral; Future      3. Hypothyroidism, unspecified type  - Patient reports persistent lack of energy affecting daily activities.  - Ordered comprehensive labs including thyroid function tests to rule out underlying systemic causes.    - T4, Free; Future  - TSH; Future     - Follow up after completion of ordered tests.         No follow-ups on  file.       Rashad Belcher MD  4/28/2025    This note was prepared using voice-recognition software.  Although efforts are made to proofread the note, some errors may persist in the final document.         [1]   Patient Active Problem List  Diagnosis    IBS (irritable bowel syndrome)    Postoperative hypothyroidism    Gastroesophageal reflux disease    Hematuria    Recurrent major depressive disorder    Insomnia    History of osteoporosis -- DXA 03/2016 was normal    History of UTI    Vaginal atrophy    Chronic constipation    Myalgia of pelvic floor    Polyp of colon    Personal history of colon cancer    Iron deficiency anemia    Seasonal allergic rhinitis due to pollen    Other nonthrombocytopenic purpura    Body mass index (BMI) of 22.0 to 22.9 in adult    Calcified granuloma of lung    Bilateral carotid artery disease    History of colon cancer   [2]   Current Outpatient Medications:     azelastine (ASTELIN) 137 mcg (0.1 %) nasal spray, 1 spray (137 mcg total) by Nasal route 2 (two) times daily. (Patient taking differently: 1 spray by Nasal route as needed.), Disp: 60 mL, Rfl: 1    buPROPion (WELLBUTRIN XL) 150 MG TB24 tablet, Take 1 tablet (150 mg total) by mouth once daily., Disp: 90 tablet, Rfl: 3    butalbital-acetaminophen-caffeine -40 mg (FIORICET, ESGIC) -40 mg per tablet, Take 1 tablet by mouth every 4 (four) hours as needed for Pain., Disp: 30 tablet, Rfl: 1    calcium-vitamin D3 (CALCIUM 500 + D) 500 mg-5 mcg (200 unit) per tablet, Take 1 tablet by mouth once daily., Disp: , Rfl:     eszopiclone (LUNESTA) 2 MG Tab, Take 1 tablet (2 mg total) by mouth nightly as needed (insomnia)., Disp: 30 tablet, Rfl: 5    fexofenadine HCl (ALLEGRA ALLERGY ORAL), Take 1 tablet by mouth once daily., Disp: , Rfl:     hydrocortisone 2.5 % cream, Apply topically 2 (two) times daily as needed., Disp: , Rfl:     levothyroxine (SYNTHROID) 200 MCG tablet, Take 1 tablet (200 mcg total) by mouth once daily.,  Disp: 90 tablet, Rfl: 2    LUTEIN ORAL, Take 1 tablet by mouth once daily., Disp: , Rfl:     multivitamin (ONE DAILY MULTIVITAMIN) per tablet, Take 1 tablet by mouth once daily., Disp: , Rfl:     rosuvastatin (CRESTOR) 10 MG tablet, Take 1 tablet (10 mg total) by mouth once daily., Disp: 90 tablet, Rfl: 3    tretinoin (RETIN-A) 0.05 % cream, Apply 0.05 % topically every evening., Disp: , Rfl:     triamcinolone acetonide 0.1% (KENALOG) 0.1 % cream, as needed., Disp: , Rfl:

## 2025-04-29 ENCOUNTER — RESULTS FOLLOW-UP (OUTPATIENT)
Dept: PRIMARY CARE CLINIC | Facility: CLINIC | Age: 75
End: 2025-04-29

## 2025-05-06 ENCOUNTER — HOSPITAL ENCOUNTER (OUTPATIENT)
Facility: HOSPITAL | Age: 75
Discharge: HOME | End: 2025-05-06
Attending: INTERNAL MEDICINE
Payer: MEDICARE

## 2025-05-06 DIAGNOSIS — R07.9 CHEST PAIN, UNSPECIFIED TYPE: ICD-10-CM

## 2025-05-06 DIAGNOSIS — G44.52 HEADACHE, NEW DAILY PERSISTENT (NDPH): ICD-10-CM

## 2025-05-06 PROCEDURE — 71046 X-RAY EXAM CHEST 2 VIEWS: CPT | Mod: TC,HCNC,PN

## 2025-05-06 PROCEDURE — 71046 X-RAY EXAM CHEST 2 VIEWS: CPT | Mod: 26,HCNC,, | Performed by: RADIOLOGY

## 2025-05-09 ENCOUNTER — HOSPITAL ENCOUNTER (OUTPATIENT)
Dept: RADIOLOGY | Facility: HOSPITAL | Age: 75
Discharge: HOME | End: 2025-05-09
Attending: INTERNAL MEDICINE
Payer: MEDICARE

## 2025-05-09 DIAGNOSIS — G44.52 HEADACHE, NEW DAILY PERSISTENT (NDPH): ICD-10-CM

## 2025-05-09 DIAGNOSIS — R07.9 CHEST PAIN, UNSPECIFIED TYPE: ICD-10-CM

## 2025-05-09 PROCEDURE — 70551 MRI BRAIN STEM W/O DYE: CPT | Mod: TC,HCNC

## 2025-05-09 PROCEDURE — 70551 MRI BRAIN STEM W/O DYE: CPT | Mod: 26,HCNC,, | Performed by: RADIOLOGY

## 2025-05-15 ENCOUNTER — PATIENT MESSAGE (OUTPATIENT)
Dept: PRIMARY CARE CLINIC | Facility: CLINIC | Age: 75
End: 2025-05-15
Payer: MEDICARE

## 2025-05-16 ENCOUNTER — PATIENT MESSAGE (OUTPATIENT)
Dept: PRIMARY CARE CLINIC | Facility: CLINIC | Age: 75
End: 2025-05-16
Payer: MEDICARE

## 2025-05-16 DIAGNOSIS — R07.9 CHEST PAIN, UNSPECIFIED TYPE: Primary | ICD-10-CM

## 2025-05-18 NOTE — TELEPHONE ENCOUNTER
LOV with Rashad Belcher MD , 4/28/2025    Pt is requesting PCP order a stress test to evaluate cause for her HA. States this was recommended. Inquired who recommended the stress test?

## 2025-05-22 ENCOUNTER — RESULTS FOLLOW-UP (OUTPATIENT)
Dept: PRIMARY CARE CLINIC | Facility: CLINIC | Age: 75
End: 2025-05-22

## 2025-05-22 ENCOUNTER — HOSPITAL ENCOUNTER (OUTPATIENT)
Dept: CARDIOLOGY | Facility: HOSPITAL | Age: 75
Discharge: HOME | End: 2025-05-22
Attending: INTERNAL MEDICINE
Payer: MEDICARE

## 2025-05-22 DIAGNOSIS — R07.9 CHEST PAIN, UNSPECIFIED TYPE: ICD-10-CM

## 2025-05-22 LAB
CV STRESS BASE HR: 57 BPM
DIASTOLIC BLOOD PRESSURE: 73 MMHG
OHS CV CPX 1 MINUTE RECOVERY HEART RATE: 113 BPM
OHS CV CPX 85 PERCENT MAX PREDICTED HEART RATE MALE: 126
OHS CV CPX ESTIMATED METS: 7
OHS CV CPX MAX PREDICTED HEART RATE: 148
OHS CV CPX PATIENT IS FEMALE: 1
OHS CV CPX PATIENT IS MALE: 0
OHS CV CPX PEAK DIASTOLIC BLOOD PRESSURE: 83 MMHG
OHS CV CPX PEAK HEAR RATE: 131 BPM
OHS CV CPX PEAK RATE PRESSURE PRODUCT: NORMAL
OHS CV CPX PEAK SYSTOLIC BLOOD PRESSURE: 197 MMHG
OHS CV CPX PERCENT MAX PREDICTED HEART RATE ACHIEVED: 92
OHS CV CPX RATE PRESSURE PRODUCT PRESENTING: 6612
STRESS ECHO POST EXERCISE DUR MIN: 5 MINUTES
STRESS ECHO POST EXERCISE DUR SEC: 54 SECONDS
SYSTOLIC BLOOD PRESSURE: 116 MMHG

## 2025-05-22 PROCEDURE — 93018 CV STRESS TEST I&R ONLY: CPT | Mod: ,,, | Performed by: INTERNAL MEDICINE

## 2025-05-22 PROCEDURE — 93016 CV STRESS TEST SUPVJ ONLY: CPT | Mod: ,,, | Performed by: INTERNAL MEDICINE

## 2025-05-22 PROCEDURE — 93017 CV STRESS TEST TRACING ONLY: CPT

## 2025-07-27 DIAGNOSIS — E89.0 POSTOPERATIVE HYPOTHYROIDISM: ICD-10-CM

## 2025-07-27 NOTE — TELEPHONE ENCOUNTER
Care Due:                  Date            Visit Type   Department     Provider  --------------------------------------------------------------------------------                                EP -                              PRIMARY      [facility]  Last Visit: 04-      CARE ([facility])   AIME Belcher  Next Visit: None Scheduled  None         None Found                                                            Last  Test          Frequency    Reason                     Performed    Due Date  --------------------------------------------------------------------------------    Lipid Panel.  12 months..  rosuvastatin.............  09- 09-    Health [facility] Embedded Care Due Messages. Reference number: [id number].   7/27/2025 8:02:09 AM CDT

## 2025-07-28 RX ORDER — LEVOTHYROXINE SODIUM 200 UG/1
200 TABLET ORAL
Qty: 90 TABLET | Refills: 2 | Status: SHIPPED | OUTPATIENT
Start: 2025-07-28

## 2025-07-28 NOTE — TELEPHONE ENCOUNTER
Provider Staff:  Action required for this patient    Requires labs      Please see care gap opportunities below in Care Due Message.    Thanks!  [facility]     Appointments      Date Provider   Last Visit   4/28/2025 Rashad Belcher MD   Next Visit   Visit date not found Rashad Belcher MD     Refill Decision Note   Ana Lilia Glass  is requesting a refill authorization.  Brief Assessment and Rationale for Refill:  Approve     Medication Therapy Plan:         Comments:     Note composed:11:46 AM 07/28/2025

## 2025-09-03 DIAGNOSIS — F32.A DEPRESSION, UNSPECIFIED DEPRESSION TYPE: ICD-10-CM

## 2025-09-03 RX ORDER — BUPROPION HYDROCHLORIDE 150 MG/1
150 TABLET ORAL DAILY
Qty: 90 TABLET | Refills: 3 | Status: SHIPPED | OUTPATIENT
Start: 2025-09-03